# Patient Record
Sex: MALE | Race: WHITE | Employment: FULL TIME | URBAN - METROPOLITAN AREA
[De-identification: names, ages, dates, MRNs, and addresses within clinical notes are randomized per-mention and may not be internally consistent; named-entity substitution may affect disease eponyms.]

---

## 2017-08-22 PROBLEM — E05.90 HYPERTHYROIDISM: Status: ACTIVE | Noted: 2017-08-22

## 2017-08-22 PROBLEM — I48.91 ATRIAL FIBRILLATION WITH RVR (HCC): Status: ACTIVE | Noted: 2017-08-22

## 2017-08-23 PROBLEM — I10 ESSENTIAL HYPERTENSION: Chronic | Status: ACTIVE | Noted: 2017-08-23

## 2017-09-05 ENCOUNTER — OFFICE VISIT (OUTPATIENT)
Dept: CARDIOLOGY CLINIC | Age: 52
End: 2017-09-05

## 2017-09-05 VITALS
WEIGHT: 199.6 LBS | SYSTOLIC BLOOD PRESSURE: 124 MMHG | DIASTOLIC BLOOD PRESSURE: 82 MMHG | HEIGHT: 74 IN | BODY MASS INDEX: 25.62 KG/M2 | HEART RATE: 80 BPM

## 2017-09-05 DIAGNOSIS — I10 ESSENTIAL HYPERTENSION: Chronic | ICD-10-CM

## 2017-09-05 DIAGNOSIS — I48.91 ATRIAL FIBRILLATION WITH RVR (HCC): Primary | ICD-10-CM

## 2017-09-05 DIAGNOSIS — E05.90 HYPERTHYROIDISM: ICD-10-CM

## 2017-09-05 PROCEDURE — 99214 OFFICE O/P EST MOD 30 MIN: CPT | Performed by: INTERNAL MEDICINE

## 2017-09-05 RX ORDER — DILTIAZEM HYDROCHLORIDE 120 MG/1
120 CAPSULE, COATED, EXTENDED RELEASE ORAL DAILY
Qty: 30 CAPSULE | Refills: 3 | Status: SHIPPED | OUTPATIENT
Start: 2017-09-05 | End: 2018-05-10 | Stop reason: ALTCHOICE

## 2017-09-05 RX ORDER — PROPRANOLOL HYDROCHLORIDE 40 MG/1
40 TABLET ORAL 3 TIMES DAILY
Qty: 60 TABLET | Refills: 3 | Status: SHIPPED | OUTPATIENT
Start: 2017-09-05 | End: 2017-10-12 | Stop reason: ALTCHOICE

## 2017-10-12 ENCOUNTER — OFFICE VISIT (OUTPATIENT)
Dept: CARDIOLOGY CLINIC | Age: 52
End: 2017-10-12

## 2017-10-12 VITALS
HEART RATE: 60 BPM | DIASTOLIC BLOOD PRESSURE: 74 MMHG | BODY MASS INDEX: 26.44 KG/M2 | WEIGHT: 206 LBS | RESPIRATION RATE: 16 BRPM | SYSTOLIC BLOOD PRESSURE: 116 MMHG | HEIGHT: 74 IN

## 2017-10-12 DIAGNOSIS — E05.90 HYPERTHYROIDISM: ICD-10-CM

## 2017-10-12 DIAGNOSIS — I10 ESSENTIAL HYPERTENSION: Chronic | ICD-10-CM

## 2017-10-12 DIAGNOSIS — I48.91 ATRIAL FIBRILLATION WITH RVR (HCC): Primary | ICD-10-CM

## 2017-10-12 PROCEDURE — 99214 OFFICE O/P EST MOD 30 MIN: CPT | Performed by: INTERNAL MEDICINE

## 2017-10-12 RX ORDER — PROPRANOLOL HYDROCHLORIDE 120 MG/1
120 CAPSULE, EXTENDED RELEASE ORAL DAILY
Qty: 90 CAPSULE | Refills: 3 | Status: SHIPPED | OUTPATIENT
Start: 2017-10-12 | End: 2017-12-07 | Stop reason: SDUPTHER

## 2017-10-12 RX ORDER — PROPRANOLOL HYDROCHLORIDE 120 MG/1
120 CAPSULE, EXTENDED RELEASE ORAL DAILY
Qty: 30 CAPSULE | Refills: 3 | Status: SHIPPED | OUTPATIENT
Start: 2017-10-12 | End: 2017-10-12 | Stop reason: SDUPTHER

## 2017-10-12 RX ORDER — METHIMAZOLE 10 MG/1
10 TABLET ORAL 3 TIMES DAILY
Status: ON HOLD | COMMUNITY
Start: 2017-09-30 | End: 2017-12-19 | Stop reason: HOSPADM

## 2017-10-12 NOTE — PATIENT INSTRUCTIONS
[id number] Score for Atrial Fibrillation Stroke Risk   Risk   Factors  Component Value   C CHF No 0   H HTN Yes 1   A2 Age >= 76 No,  (50 y.o.) 0   D DM No 0   S2 Prior Stroke/TIA No 0   V Vascular Disease No 0   A Age 74-69 No,  (56 y.o.) 0   Sc Sex male 0    [id number]  Score  1   Score last updated 43/27/47 3:35 AM    Click here for a link to the UpToDate guideline \"Atrial Fibrillation: Anticoagulation therapy to prevent embolization    Disclaimer: Risk Score calculation is dependent on accuracy of patient problem list and past encounter diagnosis.

## 2017-10-12 NOTE — PROGRESS NOTES
twice a week        Review of Systems:   · Constitutional: No Fever or Weight Loss   · Eyes: No Decreased Vision  · ENT: No Headaches, Hearing Loss or Vertigo  · Cardiovascular: as per note above   · Respiratory: No cough or wheezing and as per note above. · Gastrointestinal: No abdominal pain, appetite loss, blood in stools, constipation, diarrhea or heartburn  · Genitourinary: No dysuria, trouble voiding, or hematuria  · Musculoskeletal:  None  · Integumentary: No rash or pruritis  · Neurological: No TIA or stroke symptoms  · Psychiatric: No anxiety or depression  · Endocrine: No malaise, fatigue or temperature intolerance  · Hematologic/Lymphatic: No bleeding problems, blood clots or swollen lymph nodes  · Allergic/Immunologic: No nasal congestion or hives    Objective:      Physical Exam:  /74 (Site: Left Arm, Position: Sitting, Cuff Size: Medium Adult)   Pulse 60   Resp 16   Ht 6' 2\" (1.88 m)   Wt 206 lb (93.4 kg)   BMI 26.45 kg/m²   Wt Readings from Last 3 Encounters:   10/12/17 206 lb (93.4 kg)   09/05/17 199 lb 9.6 oz (90.5 kg)   08/26/17 199 lb (90.3 kg)     Body mass index is 26.45 kg/m². Vitals:    10/12/17 0935   BP: 116/74   Pulse: 60   Resp: 16        General Appearance:  No distress, conversant  Constitutional:  Well developed, Well nourished, No acute distress, Non-toxic appearance. HENT:  Normocephalic, Atraumatic, Bilateral external ears normal, Oropharynx moist, No oral exudates, Nose normal. Neck- Normal range of motion, No tenderness, Supple, No stridor,no apical-carotid delay  Eyes:  PERRL, EOMI, Conjunctiva normal, No discharge. Respiratory:  Normal breath sounds, No respiratory distress, No wheezing, No chest tenderness. ,no use of accessory muscles,   Cardiovascular: (PMI) apex non displaced,no lifts no thrills,irregular, tachy, S1 and S2 audible, No added heart sounds, No signs of ankle edema, or volume overload, No evidence of JVD  GI:  Bowel sounds normal, Soft, No tenderness, No masses, No gross visceromegaly   :  No costovertebral angle tenderness   Musculoskeletal:  No edema, no tenderness, no deformities. Back- no tenderness  Integument:  Well hydrated, no rash   Lymphatic:  No lymphadenopathy noted   Neurologic:  Alert & oriented x 3, CN 2-12 normal, normal motor function, normal sensory function, no focal deficits noted   Psychiatric:  Speech and behavior appropriate       Medical decision making and Data review:  DATA:  Lab Results   Component Value Date    TROPONINT <0.010 08/22/2017     BNP:    Lab Results   Component Value Date    PROBNP <5.00 08/24/2017     PT/INR:  No results found for: PTINR  No results found for: LABA1C  No results found for: CHOL, TRIG, HDL, LDLCALC, LDLDIRECT  Lab Results   Component Value Date    ALT 32 08/22/2017    AST 25 08/22/2017     Echo: 8/23/17  Summary   Normal left ventricle size and systolic function. EF is 60 %   Mild to moderate bilateral atrial dilatation.   Right ventricular systolic pressure of 43 mm Hg consistent with moderate   pulmonary hypertension.   Moderate mitral, tricuspid and trace pulmonic regurgitation is present.   Mild aortic regurgitation is noted.   No evidence of pericardial effusion.       All labs, medications and tests reviewed by myself including data and history from outside source , patient and available family . Assessment & Plan:      1. Atrial fibrillation with RVR ([address]    2. Hyperthyroidism    3. Essential hypertension         Atrial fibrillation with RVR ([facility])  Continue rate control plan on cardizem 120 mg daily. Continue eliquis 5mg bid. Switch to inderal 120 mg daily , we will attempt cardioversion in 2 month once thyroid issues are resolved   He does have biatrial enlargement but I think he deserves a cardioversion attempt. If heDoes not cardiovert on his on own  We will attempt cardioversion only when his t3 and t4 are within normal range .      Essential hypertension  Blood pressure well controlled except for few numbers in 140's     Hyperthyroidism  On methamazole, seeing Dr Abelardo Todd next week. Called and d/w dr Abelardo Todd     Dyslipidemia :  Esteban Keenan had lab work recently,  Lipid profile was reviewed with patient. He had an FMLA paperwork and disability paperwork which was filled out    Counseled extensively and medication compliance urged. Various goals were discussed and questions answered. Continue current medications. Appropriate prescriptions are addressed and refills ordered. Questions answered and patient verbalizes understanding. Call for any problems, questions, or concerns. Continue all other medications of all above medical condition listed as is. Return in about 2 months (around 12/12/2017).

## 2017-10-13 ENCOUNTER — TELEPHONE (OUTPATIENT)
Dept: CARDIOLOGY CLINIC | Age: 52
End: 2017-10-13

## 2017-10-13 NOTE — TELEPHONE ENCOUNTER
Received fax from [address] re:  Prescription sent to [facility] yesterday for Propranolol 120 mg ER 1 daily may be duplicate therapy with beta blockers patient is already taking. Called [facility] ([phone]) and spoke w/pharmacist (Partha)---advised pharmacist that Dr. Ruby Roman saw patient for OV yesterday and recommended patient to switch from short acting propranolol 40 mg TID to propranolol  mg qd. Pharmacist verbalized understanding and states she will get patient's new prescription filled and sent out.

## 2017-11-02 LAB
CHOLESTEROL, TOTAL: 217 MG/DL
CHOLESTEROL/HDL RATIO: NORMAL
HDLC SERPL-MCNC: 70 MG/DL (ref 35–70)
LDL CHOLESTEROL CALCULATED: 135 MG/DL (ref 0–160)
T3 FREE: NORMAL
T4 FREE: 0.3
TRIGL SERPL-MCNC: 58 MG/DL
TSH SERPL DL<=0.05 MIU/L-ACNC: 0 UIU/ML
VLDLC SERPL CALC-MCNC: NORMAL MG/DL

## 2017-11-03 LAB
CHOLESTEROL, TOTAL: 217 MG/DL
CHOLESTEROL/HDL RATIO: NORMAL
HDLC SERPL-MCNC: 70 MG/DL (ref 35–70)
LDL CHOLESTEROL CALCULATED: 135 MG/DL (ref 0–160)
T4 TOTAL: 0.3
TRIGL SERPL-MCNC: 58 MG/DL
TSH SERPL DL<=0.05 MIU/L-ACNC: 0 UIU/ML
VLDLC SERPL CALC-MCNC: NORMAL MG/DL

## 2017-11-21 ENCOUNTER — HOSPITAL ENCOUNTER (OUTPATIENT)
Dept: NUCLEAR MEDICINE | Age: 52
Discharge: HOME | End: 2017-11-21
Attending: INTERNAL MEDICINE | Admitting: INTERNAL MEDICINE

## 2017-11-21 DIAGNOSIS — E05.90 THYROTOXICOSIS WITHOUT THYROID STORM: ICD-10-CM

## 2017-11-22 ENCOUNTER — HOSPITAL ENCOUNTER (OUTPATIENT)
Dept: NUCLEAR MEDICINE | Age: 52
Discharge: HOME | End: 2017-11-20
Attending: INTERNAL MEDICINE | Admitting: INTERNAL MEDICINE

## 2017-11-22 DIAGNOSIS — E05.90 THYROTOXICOSIS WITHOUT THYROID STORM: ICD-10-CM

## 2017-11-22 DIAGNOSIS — E05.90 T3 THYROTOXICOSIS: ICD-10-CM

## 2017-12-07 ENCOUNTER — OFFICE VISIT (OUTPATIENT)
Dept: CARDIOLOGY CLINIC | Age: 52
End: 2017-12-07

## 2017-12-07 VITALS
DIASTOLIC BLOOD PRESSURE: 76 MMHG | WEIGHT: 214 LBS | HEIGHT: 74 IN | BODY MASS INDEX: 27.46 KG/M2 | HEART RATE: 97 BPM | SYSTOLIC BLOOD PRESSURE: 120 MMHG

## 2017-12-07 DIAGNOSIS — I10 ESSENTIAL HYPERTENSION: Chronic | ICD-10-CM

## 2017-12-07 DIAGNOSIS — I48.91 ATRIAL FIBRILLATION WITH RVR (HCC): Primary | ICD-10-CM

## 2017-12-07 DIAGNOSIS — E05.90 HYPERTHYROIDISM: ICD-10-CM

## 2017-12-07 PROCEDURE — 93000 ELECTROCARDIOGRAM COMPLETE: CPT | Performed by: INTERNAL MEDICINE

## 2017-12-07 PROCEDURE — 99214 OFFICE O/P EST MOD 30 MIN: CPT | Performed by: INTERNAL MEDICINE

## 2017-12-07 RX ORDER — PROPRANOLOL HYDROCHLORIDE 160 MG/1
160 CAPSULE, EXTENDED RELEASE ORAL DAILY
Qty: 30 CAPSULE | Refills: 5 | Status: SHIPPED | OUTPATIENT
Start: 2017-12-07 | End: 2017-12-11 | Stop reason: SDUPTHER

## 2017-12-07 RX ORDER — FUROSEMIDE 20 MG/1
20 TABLET ORAL 2 TIMES DAILY
Qty: 60 TABLET | Refills: 3 | Status: SHIPPED | OUTPATIENT
Start: 2017-12-07 | End: 2017-12-08 | Stop reason: SDUPTHER

## 2017-12-07 RX ORDER — FUROSEMIDE 20 MG/1
20 TABLET ORAL DAILY
Qty: 60 TABLET | Refills: 3 | Status: ON HOLD | OUTPATIENT
Start: 2017-12-07 | End: 2017-12-19 | Stop reason: HOSPADM

## 2017-12-07 NOTE — PROGRESS NOTES
Mother     Heart Attack Father     Heart Disease Father     High Cholesterol Father      Social History   Substance Use Topics    Smoking status: Never Smoker    Smokeless tobacco: Current User     Types: Snuff    Alcohol use 1.2 oz/week     2 Cans of beer per week      Comment: once or twice a week        Review of Systems:   · Constitutional: No Fever or Weight Loss   · Eyes: No Decreased Vision  · ENT: No Headaches, Hearing Loss or Vertigo  · Cardiovascular: as per note above   · Respiratory: No cough or wheezing and as per note above. · Gastrointestinal: No abdominal pain, appetite loss, blood in stools, constipation, diarrhea or heartburn  · Genitourinary: No dysuria, trouble voiding, or hematuria  · Musculoskeletal:  None  · Integumentary: No rash or pruritis  · Neurological: No TIA or stroke symptoms  · Psychiatric: No anxiety or depression  · Endocrine: No malaise, fatigue or temperature intolerance  · Hematologic/Lymphatic: No bleeding problems, blood clots or swollen lymph nodes  · Allergic/Immunologic: No nasal congestion or hives    Objective:      Physical Exam:  /76   Pulse 97   Ht 6' 2\" (1.88 m)   Wt 214 lb (97.1 kg)   BMI 27.48 kg/m²   Wt Readings from Last 3 Encounters:   12/07/17 214 lb (97.1 kg)   10/12/17 206 lb (93.4 kg)   09/05/17 199 lb 9.6 oz (90.5 kg)     Body mass index is 27.48 kg/m². Vitals:    12/07/17 1631   BP: 120/76   Pulse: 97        General Appearance:  No distress, conversant  Constitutional:  Well developed, Well nourished, No acute distress, Non-toxic appearance. HENT:  Normocephalic, Atraumatic, Bilateral external ears normal, Oropharynx moist, No oral exudates, Nose normal. Neck- Normal range of motion, No tenderness, Supple, No stridor,no apical-carotid delay  Eyes:  PERRL, EOMI, Conjunctiva normal, No discharge. Respiratory:  Normal breath sounds, No respiratory distress, No wheezing, No chest tenderness. ,no use of accessory muscles,   Cardiovascular: are resolved . He does have biatrial enlargement but I think he deserves a cardioversion attempt. If he Does not convert on his on own, try small dose of lasix for SOB and leg swelling  We will attempt cardioversion only when his t3 and t4 are within normal range . Essential hypertension  Blood pressure well controlled except for few numbers in 140's     Hyperthyroidism  On methamazole, seeing Dr Adam Hutson next week. Called and d/w dr Adam Hutson     Dyslipidemia :  Henry Arrieta had lab work recently,  Lipid profile was reviewed with patient. He had an FMLA paperwork and disability paperwork which was filled out    Counseled extensively and medication compliance urged. Various goals were discussed and questions answered. Continue current medications. Appropriate prescriptions are addressed and refills ordered. Questions answered and patient verbalizes understanding. Call for any problems, questions, or concerns. Continue all other medications of all above medical condition listed as is. Return in about 5 weeks (around 1/11/2018).

## 2017-12-08 RX ORDER — FUROSEMIDE 20 MG/1
20 TABLET ORAL 2 TIMES DAILY
Qty: 180 TABLET | Refills: 1 | Status: SHIPPED | OUTPATIENT
Start: 2017-12-08 | End: 2018-01-18 | Stop reason: ALTCHOICE

## 2017-12-08 NOTE — TELEPHONE ENCOUNTER
Received fax from [address] [place] [address] requesting patient be allowed to receive 90-day supply on his Furosemide prescription for cost savings (eRx sent to [facility] yesterday was for 30-day supply). Chart review shows 2 Rxs sent yesterday for Furosemide---one Rx for 20 mg BID and another for 20 mg 3 times a week which may be titrated up as needed for swelling. Will confirm w/Dr. Ruby Roman if patient is supposed to use both Rxs?         If so, the PRN Rx is already enough for 90-day supply; eRx for Furosemide 20 mg BID #180 Rx1 pended and routed to Dr. Ruby Roman for approval.

## 2017-12-11 NOTE — TELEPHONE ENCOUNTER
Received request from [employer] for patient to be allowed to receive 90-day supply on his Propranolol prescription (Rx sent recently was for 30-day supply).       eRx to [facility]:  Propranolol  mg qd #90 Rx1 pended and routed to Dr. Thompson Weiss for approval.

## 2017-12-12 RX ORDER — PROPRANOLOL HYDROCHLORIDE 160 MG/1
160 CAPSULE, EXTENDED RELEASE ORAL DAILY
Qty: 90 CAPSULE | Refills: 1 | Status: SHIPPED | OUTPATIENT
Start: 2017-12-12 | End: 2017-12-15

## 2017-12-13 ENCOUNTER — OFFICE VISIT (OUTPATIENT)
Dept: SURGERY | Age: 52
End: 2017-12-13

## 2017-12-13 ENCOUNTER — TELEPHONE (OUTPATIENT)
Dept: SURGERY | Age: 52
End: 2017-12-13

## 2017-12-13 VITALS
DIASTOLIC BLOOD PRESSURE: 72 MMHG | BODY MASS INDEX: 23.1 KG/M2 | HEIGHT: 74 IN | SYSTOLIC BLOOD PRESSURE: 109 MMHG | WEIGHT: 180 LBS | HEART RATE: 68 BPM

## 2017-12-13 DIAGNOSIS — E05.80 THYROID TOXICITY, EXOGENOUS: Primary | ICD-10-CM

## 2017-12-13 PROCEDURE — 99204 OFFICE O/P NEW MOD 45 MIN: CPT | Performed by: SURGERY

## 2017-12-13 ASSESSMENT — ENCOUNTER SYMPTOMS
ANAL BLEEDING: 0
SORE THROAT: 0
EYE REDNESS: 0
COLOR CHANGE: 0
RECTAL PAIN: 0
STRIDOR: 0
EYE ITCHING: 0
APNEA: 0
PHOTOPHOBIA: 0
CHOKING: 0
CONSTIPATION: 0
BACK PAIN: 0

## 2017-12-13 NOTE — LETTER
Procedure Scheduling Request   [phone] Fax [phone]    Location: [address] [place] [address]     Patient Name:  Frankey Raker   Birthday:   1965    Social Security:     Gender:  male   Home Phone:  [phone]  Cell Phone: [phone] (home)      Address:  [address]      Primary Insurance:  [employer]   ID#:   [id number]  Secondary Ins:                     ID#:       Patient Type:    23HR Observe    Practitioner: Dr. Myah Sandhu      Procedure: Thyroidectomy    Anesthesia:  General    Diagnosis:   Thyrotoxicosis  Procedure Date:  12/18/2017  Time:9:30AM  Length of procedure: 1 Hour  Testing:  Phone Assessment        Pre-Admission testing   Date/Time:      Special needs for Procedure:   None    Weight: Weight: 180 lb (81.6 kg)    Is the Patient on Blood Thinner? Yes      No   If YES told to stop on: Allergy:  LATEX    Iodine    DYE  Other Allergies: Will the patient have a  the day of procedure? Yes    No  H&P:                                                                 Consent:       Will be completed day of Procedure                  Completed consent faxed   Surgeon will bring                                              Will be completed day of procedure   Will be completed by Dr. Isabel Carlton in [facility]    In EPIC/Chart     **For IR procedures please fax all orders, H&P, Medication lists and prior reports to [phone]

## 2017-12-13 NOTE — PROGRESS NOTES
HENT: Negative for ear pain, mouth sores, sore throat and tinnitus. Eyes: Negative for photophobia, redness and itching. Respiratory: Negative for apnea, choking and stridor. Cardiovascular: Negative for chest pain and palpitations. Gastrointestinal: Negative for anal bleeding, constipation and rectal pain. Endocrine: Negative for polydipsia. Genitourinary: Negative for enuresis, flank pain and hematuria. Musculoskeletal: Negative for back pain, joint swelling and myalgias. Skin: Negative for color change and pallor. Allergic/Immunologic: Negative for environmental allergies. Neurological: Negative for syncope and speech difficulty. Psychiatric/Behavioral: Negative for confusion and hallucinations. OBJECTIVE:  Physical Exam:    /72   Pulse 68   Ht 6' 2\" (1.88 m)   Wt 180 lb (81.6 kg)   BMI 23.11 kg/m²      Physical Exam   Constitutional: He is oriented to person, place, and time. He appears well-developed and well-nourished. HENT:   Head: Normocephalic. Eyes: Pupils are equal, round, and reactive to light. Neck: Normal range of motion. Neck supple. Cardiovascular: Normal rate. Pulmonary/Chest: Effort normal.   Abdominal: Soft. He exhibits no distension and no mass. There is no tenderness. There is no rebound and no guarding. Musculoskeletal: Normal range of motion. Neurological: He is alert and oriented to person, place, and time. Skin: Skin is warm. Psychiatric: He has a normal mood and affect. ASSESSMENT:  1. Thyroid toxicity, exogenous          PLAN:  Treatment:  Will procced [facility] total thyroidectomy. Patient counseled on risks, benefits, and alternatives of treatment plan at length today. Patient states an understanding and willingness to proceed with plan. No orders of the defined types were placed in this encounter. No orders of the defined types were placed in this encounter.        Follow Up:  Return in about 2 weeks (around 12/27/2017).     Anthony Ward MD

## 2017-12-13 NOTE — TELEPHONE ENCOUNTER
Patient is scheduled for Thyroidectomy @ [facility] 12/18/17 @ 9:30. PAT 12/15/17 @ 10:00. Patient instructed to hold Blood Thinner beginning 12/13/17 and hold Lasix the day of surgery. NPO after midnight    P/O appointment 1/3/18 @ 1:00 in [facility].

## 2017-12-14 ENCOUNTER — TELEPHONE (OUTPATIENT)
Dept: CARDIOLOGY CLINIC | Age: 52
End: 2017-12-14

## 2017-12-14 NOTE — TELEPHONE ENCOUNTER
HE is intermediate risk , HE can hold eliquis for 3 days before surgery and restart after surgery when safe

## 2017-12-14 NOTE — TELEPHONE ENCOUNTER
Dr Riya Galicia sent a request for cardiac clearance set to have Thyroidectomy 12/18 Dr Irineo Patino is requesting clearance request scanned into reg

## 2017-12-15 ENCOUNTER — TELEPHONE (OUTPATIENT)
Dept: CARDIOLOGY CLINIC | Age: 52
End: 2017-12-15

## 2017-12-15 ENCOUNTER — HOSPITAL ENCOUNTER (OUTPATIENT)
Dept: PREADMISSION TESTING | Age: 52
Discharge: HOME | End: 2017-12-15
Attending: SURGERY | Admitting: SURGERY

## 2017-12-15 VITALS
SYSTOLIC BLOOD PRESSURE: 116 MMHG | OXYGEN SATURATION: 99 % | TEMPERATURE: 98.2 F | RESPIRATION RATE: 16 BRPM | HEIGHT: 73 IN | HEART RATE: 68 BPM | WEIGHT: 204.38 LBS | DIASTOLIC BLOOD PRESSURE: 76 MMHG | BODY MASS INDEX: 27.09 KG/M2

## 2017-12-15 LAB
ANION GAP SERPL CALCULATED.3IONS-SCNC: 9 MMOL/L (ref 4–16)
BUN BLDV-MCNC: 10 MG/DL (ref 6–23)
CALCIUM SERPL-MCNC: 9.1 MG/DL (ref 8.3–10.6)
CHLORIDE BLD-SCNC: 104 MMOL/L (ref 99–110)
CO2: 28 MMOL/L (ref 21–32)
CREAT SERPL-MCNC: 0.8 MG/DL (ref 0.9–1.3)
EKG ATRIAL RATE: 272 BPM
EKG DIAGNOSIS: NORMAL
EKG P AXIS: 71 DEGREES
EKG Q-T INTERVAL: 398 MS
EKG QRS DURATION: 88 MS
EKG QTC CALCULATION (BAZETT): 423 MS
EKG R AXIS: 1 DEGREES
EKG T AXIS: 60 DEGREES
EKG VENTRICULAR RATE: 68 BPM
GFR AFRICAN AMERICAN: >60 ML/MIN/1.73M2
GFR NON-AFRICAN AMERICAN: >60 ML/MIN/1.73M2
GLUCOSE BLD-MCNC: 103 MG/DL (ref 70–99)
HCT VFR BLD CALC: 46.9 % (ref 42–52)
HEMOGLOBIN: 15.6 GM/DL (ref 13.5–18)
MCH RBC QN AUTO: 27.5 PG (ref 27–31)
MCHC RBC AUTO-ENTMCNC: 33.3 % (ref 32–36)
MCV RBC AUTO: 82.7 FL (ref 78–100)
PDW BLD-RTO: 14.6 % (ref 11.7–14.9)
PLATELET # BLD: 249 K/CU MM (ref 140–440)
PMV BLD AUTO: 9.1 FL (ref 7.5–11.1)
POTASSIUM SERPL-SCNC: 5.7 MMOL/L (ref 3.5–5.1)
RBC # BLD: 5.67 M/CU MM (ref 4.6–6.2)
SODIUM BLD-SCNC: 141 MMOL/L (ref 135–145)
WBC # BLD: 6.7 K/CU MM (ref 4–10.5)

## 2017-12-15 RX ORDER — PROPRANOLOL HYDROCHLORIDE 20 MG/1
20 TABLET ORAL 3 TIMES DAILY
COMMUNITY
End: 2018-05-10 | Stop reason: ALTCHOICE

## 2017-12-15 RX ORDER — POTASSIUM IODIDE 1 G/ML
100 SOLUTION ORAL 3 TIMES DAILY
Status: ON HOLD | COMMUNITY
End: 2017-12-19 | Stop reason: HOSPADM

## 2017-12-15 NOTE — TELEPHONE ENCOUNTER
Cardiac clearance for Thyroidectomy on 12/18/17. Faxed the clearance to Dr. Maggy Albert and Dr. Radha Mayer.

## 2017-12-18 PROBLEM — E05.80: Status: ACTIVE | Noted: 2017-12-18

## 2018-01-03 ENCOUNTER — OFFICE VISIT (OUTPATIENT)
Dept: SURGERY | Age: 53
End: 2018-01-03

## 2018-01-03 VITALS
DIASTOLIC BLOOD PRESSURE: 64 MMHG | HEART RATE: 72 BPM | SYSTOLIC BLOOD PRESSURE: 131 MMHG | BODY MASS INDEX: 27.38 KG/M2 | HEIGHT: 73 IN | WEIGHT: 206.57 LBS

## 2018-01-03 DIAGNOSIS — E05.80 THYROID TOXICITY, EXOGENOUS: Primary | ICD-10-CM

## 2018-01-03 PROCEDURE — 99024 POSTOP FOLLOW-UP VISIT: CPT | Performed by: SURGERY

## 2018-01-17 ENCOUNTER — OFFICE VISIT (OUTPATIENT)
Dept: SURGERY | Age: 53
End: 2018-01-17

## 2018-01-17 VITALS
SYSTOLIC BLOOD PRESSURE: 116 MMHG | BODY MASS INDEX: 26.44 KG/M2 | DIASTOLIC BLOOD PRESSURE: 72 MMHG | HEART RATE: 85 BPM | HEIGHT: 74 IN | WEIGHT: 206 LBS | OXYGEN SATURATION: 97 %

## 2018-01-17 DIAGNOSIS — Z98.890 POST-OPERATIVE STATE: Primary | ICD-10-CM

## 2018-01-17 PROCEDURE — 99024 POSTOP FOLLOW-UP VISIT: CPT | Performed by: PHYSICIAN ASSISTANT

## 2018-01-17 NOTE — PROGRESS NOTES
Chief Complaint   Patient presents with    Post-Op Check     2nd p/o thyroidectomy 12/18/18         SUBJECTIVE:  Patient here for post op visit s/p thyroidectomy. This is 2nd visit. Pain is minimal.  Wounds: min bruising and no discharge. Some fatigue    OBJECTIVE:  Physical Exam   Constitutional: He is oriented to person, place, and time. He appears well-developed and well-nourished. No distress. Neck:       Incision line healing well. No signs of infection or drainage. No problems swallowing. No lymphadenopathy. Abdominal: Soft. Bowel sounds are normal. He exhibits no distension and no mass. There is no tenderness. There is no rebound and no guarding. Neurological: He is alert and oriented to person, place, and time. Skin: Skin is warm and dry. No rash noted. He is not diaphoretic. No erythema. No pallor. Psychiatric: He has a normal mood and affect. Wound well healed without signs of active infection. Suture line intact. Path reveals:   Final Pathologic Diagnosis:  Thyroid; total thyroidectomy:  -     Thyroid showing diffuse hyperplastic changes,  clinically thyrotoxicosis. -     Left parathyroid tissue. ASSESSMENT:  Patient doing well on this post operative check. Wounds well healed. 1. Post-operative state        PLAN:  Continue same  Increase activity as tolerated  Can go back to work without restrictions  F/U PRN  Will be seeing Brian Lereagan in Feb. Pt will call and get labs drawn earlier. No orders of the defined types were placed in this encounter. No orders of the defined types were placed in this encounter. Follow Up: Return if symptoms worsen or fail to improve.   Angel Morel PA-C

## 2018-01-18 ENCOUNTER — OFFICE VISIT (OUTPATIENT)
Dept: CARDIOLOGY CLINIC | Age: 53
End: 2018-01-18

## 2018-01-18 VITALS
RESPIRATION RATE: 14 BRPM | WEIGHT: 213 LBS | BODY MASS INDEX: 27.34 KG/M2 | DIASTOLIC BLOOD PRESSURE: 80 MMHG | HEART RATE: 84 BPM | SYSTOLIC BLOOD PRESSURE: 124 MMHG | HEIGHT: 74 IN

## 2018-01-18 DIAGNOSIS — E05.90 HYPERTHYROIDISM: ICD-10-CM

## 2018-01-18 DIAGNOSIS — I10 ESSENTIAL HYPERTENSION: Chronic | ICD-10-CM

## 2018-01-18 DIAGNOSIS — E05.80: ICD-10-CM

## 2018-01-18 DIAGNOSIS — I48.3 TYPICAL ATRIAL FLUTTER (HCC): ICD-10-CM

## 2018-01-18 DIAGNOSIS — I48.91 ATRIAL FIBRILLATION WITH RVR (HCC): Primary | ICD-10-CM

## 2018-01-18 PROCEDURE — 93000 ELECTROCARDIOGRAM COMPLETE: CPT | Performed by: INTERNAL MEDICINE

## 2018-01-18 PROCEDURE — 99214 OFFICE O/P EST MOD 30 MIN: CPT | Performed by: INTERNAL MEDICINE

## 2018-01-18 NOTE — PROGRESS NOTES
Typical atrial flutter ([facility] [address]    4. Hyperthyroidism    5. Essential hypertension         Atrial fibrillation with RVR ([facility])  He is in sinus rhythm now. I would continue Eliquis until we're sure that he is not having paroxysmal A. Fib. Place a 30 day event monitor. If the event monitor shows no A. fib. He can stop Eliquis. We'll try to wean off Cardizem    Essential hypertension  Blood pressure well controlled except for few numbers in 140's     Hyperthyroidism  Status thyrodectomy , on levothyroxine now,. Sees Dr Andres Mccloud . We'll try to wean off propranolol     Dyslipidemia :  Frank De La Torre had lab work recently,  Lipid profile was reviewed with patient. He had an FMLA paperwork and disability paperwork which was filled out. He can return to work as soon as possible    Counseled extensively and medication compliance urged. Various goals were discussed and questions answered. Continue current medications. Appropriate prescriptions are addressed and refills ordered. Questions answered and patient verbalizes understanding. Call for any problems, questions, or concerns. Continue all other medications of all above medical condition listed as is. Return in about 3 months (around 4/18/2018).

## 2018-01-23 ENCOUNTER — NURSE ONLY (OUTPATIENT)
Dept: CARDIOLOGY CLINIC | Age: 53
End: 2018-01-23

## 2018-01-23 DIAGNOSIS — I48.91 ATRIAL FIBRILLATION WITH RVR (HCC): Primary | ICD-10-CM

## 2018-01-23 PROCEDURE — 93270 REMOTE 30 DAY ECG REV/REPORT: CPT | Performed by: INTERNAL MEDICINE

## 2018-01-31 ENCOUNTER — TELEPHONE (OUTPATIENT)
Dept: CARDIOLOGY CLINIC | Age: 53
End: 2018-01-31

## 2018-02-05 ENCOUNTER — TELEPHONE (OUTPATIENT)
Dept: CARDIOLOGY CLINIC | Age: 53
End: 2018-02-05

## 2018-02-26 ENCOUNTER — TELEPHONE (OUTPATIENT)
Dept: CARDIOLOGY CLINIC | Age: 53
End: 2018-02-26

## 2018-02-26 PROCEDURE — 93272 ECG/REVIEW INTERPRET ONLY: CPT | Performed by: INTERNAL MEDICINE

## 2018-02-27 ENCOUNTER — TELEPHONE (OUTPATIENT)
Dept: CARDIOLOGY CLINIC | Age: 53
End: 2018-02-27

## 2018-05-10 ENCOUNTER — OFFICE VISIT (OUTPATIENT)
Dept: CARDIOLOGY CLINIC | Age: 53
End: 2018-05-10

## 2018-05-10 VITALS
HEART RATE: 76 BPM | DIASTOLIC BLOOD PRESSURE: 80 MMHG | RESPIRATION RATE: 16 BRPM | WEIGHT: 216 LBS | HEIGHT: 74 IN | BODY MASS INDEX: 27.72 KG/M2 | SYSTOLIC BLOOD PRESSURE: 122 MMHG

## 2018-05-10 DIAGNOSIS — E05.90 HYPERTHYROIDISM: ICD-10-CM

## 2018-05-10 DIAGNOSIS — I10 ESSENTIAL HYPERTENSION: Chronic | ICD-10-CM

## 2018-05-10 DIAGNOSIS — I48.91 ATRIAL FIBRILLATION WITH RVR (HCC): Primary | ICD-10-CM

## 2018-05-10 DIAGNOSIS — I48.3 TYPICAL ATRIAL FLUTTER (HCC): ICD-10-CM

## 2018-05-10 PROCEDURE — 99213 OFFICE O/P EST LOW 20 MIN: CPT | Performed by: INTERNAL MEDICINE

## 2018-05-10 RX ORDER — LEVOTHYROXINE SODIUM 0.12 MG/1
135 TABLET ORAL DAILY
COMMUNITY
Start: 2018-05-03 | End: 2021-08-27

## 2018-05-10 RX ORDER — ASPIRIN 325 MG
325 TABLET, DELAYED RELEASE (ENTERIC COATED) ORAL DAILY
Qty: 30 TABLET | Refills: 3 | Status: SHIPPED | OUTPATIENT
Start: 2018-05-10

## 2018-05-31 NOTE — PROGRESS NOTES
Chief Complaint   Patient presents with    Post-Op Check     1s5t post op thyroidectomy [address] 12/18/17         SUBJECTIVE:  Patient here for post op visit s/p total Thyroidectomy . This is 1st visit. Pain is minimal.  Wounds: min bruising and no discharge. Normal BM    OBJECTIVE:  Physical Exam    Wound well healed without signs of active infection. Suture line intact. Abdomen soft, nontender, nondistended. Path reveals: (NOTE)  Specimen #[id number]  Final Pathologic Diagnosis:  Thyroid; total thyroidectomy:  -     Thyroid showing diffuse hyperplastic changes,  clinically thyrotoxicosis. -     Left parathyroid tissue. Electronically Signed Out By Dereje Gay MD  Specimens Received:  Thyroid: two sutures is right; one suture is left       ASSESSMENT:  Patient doing well on this post operative check. Wounds well healed. 1. Thyroid toxicity, exogenous        PLAN:    Continue same  Increase activity as tolerated        No orders of the defined types were placed in this encounter. No orders of the defined types were placed in this encounter. Follow Up: Return in about 2 weeks (around 1/17/2018).     Lainey Yusuf MD
none

## 2020-08-08 ENCOUNTER — APPOINTMENT (OUTPATIENT)
Dept: GENERAL RADIOLOGY | Age: 55
End: 2020-08-08
Payer: COMMERCIAL

## 2020-08-08 ENCOUNTER — HOSPITAL ENCOUNTER (EMERGENCY)
Age: 55
Discharge: HOME | End: 2020-08-09
Attending: EMERGENCY MEDICINE
Payer: COMMERCIAL

## 2020-08-08 VITALS
BODY MASS INDEX: 28.23 KG/M2 | SYSTOLIC BLOOD PRESSURE: 130 MMHG | RESPIRATION RATE: 16 BRPM | HEIGHT: 74 IN | TEMPERATURE: 98.7 F | WEIGHT: 220 LBS | HEART RATE: 69 BPM | DIASTOLIC BLOOD PRESSURE: 88 MMHG | OXYGEN SATURATION: 97 %

## 2020-08-08 LAB
ALBUMIN SERPL-MCNC: 4.5 GM/DL (ref 3.4–5)
ALP BLD-CCNC: 49 IU/L (ref 40–129)
ALT SERPL-CCNC: 16 U/L (ref 10–40)
ANION GAP SERPL CALCULATED.3IONS-SCNC: 10 MMOL/L (ref 4–16)
AST SERPL-CCNC: 22 IU/L (ref 15–37)
BASE EXCESS MIXED: 4.3 (ref 0–1.2)
BASOPHILS ABSOLUTE: 0 K/CU MM
BASOPHILS RELATIVE PERCENT: 0.5 % (ref 0–1)
BILIRUB SERPL-MCNC: 0.5 MG/DL (ref 0–1)
BUN BLDV-MCNC: 10 MG/DL (ref 6–23)
CALCIUM SERPL-MCNC: 8.1 MG/DL (ref 8.3–10.6)
CHLORIDE BLD-SCNC: 102 MMOL/L (ref 99–110)
CO2: 29 MMOL/L (ref 21–32)
CREAT SERPL-MCNC: 1.2 MG/DL (ref 0.9–1.3)
DIFFERENTIAL TYPE: ABNORMAL
EOSINOPHILS ABSOLUTE: 0.1 K/CU MM
EOSINOPHILS RELATIVE PERCENT: 2 % (ref 0–3)
GFR AFRICAN AMERICAN: >60 ML/MIN/1.73M2
GFR NON-AFRICAN AMERICAN: >60 ML/MIN/1.73M2
GLUCOSE BLD-MCNC: 111 MG/DL (ref 70–99)
HCO3 VENOUS: 31.8 MMOL/L (ref 19–25)
HCT VFR BLD CALC: 45.4 % (ref 42–52)
HEMOGLOBIN: 15.1 GM/DL (ref 13.5–18)
IMMATURE NEUTROPHIL %: 0.3 % (ref 0–0.43)
LYMPHOCYTES ABSOLUTE: 1.6 K/CU MM
LYMPHOCYTES RELATIVE PERCENT: 27.2 % (ref 24–44)
MAGNESIUM: 2.2 MG/DL (ref 1.8–2.4)
MCH RBC QN AUTO: 28.5 PG (ref 27–31)
MCHC RBC AUTO-ENTMCNC: 33.3 % (ref 32–36)
MCV RBC AUTO: 85.7 FL (ref 78–100)
MONOCYTES ABSOLUTE: 0.6 K/CU MM
MONOCYTES RELATIVE PERCENT: 10.1 % (ref 0–4)
NUCLEATED RBC %: 0 %
O2 SAT, VEN: 71.7 % (ref 50–70)
PCO2, VEN: 59 MMHG (ref 38–52)
PDW BLD-RTO: 13.2 % (ref 11.7–14.9)
PH VENOUS: 7.34 (ref 7.32–7.42)
PLATELET # BLD: 181 K/CU MM (ref 140–440)
PMV BLD AUTO: 9.1 FL (ref 7.5–11.1)
PO2, VEN: 39 MMHG (ref 28–48)
POTASSIUM SERPL-SCNC: 3.9 MMOL/L (ref 3.5–5.1)
PRO-BNP: 45.37 PG/ML
RBC # BLD: 5.3 M/CU MM (ref 4.6–6.2)
SEGMENTED NEUTROPHILS ABSOLUTE COUNT: 3.6 K/CU MM
SEGMENTED NEUTROPHILS RELATIVE PERCENT: 59.9 % (ref 36–66)
SODIUM BLD-SCNC: 141 MMOL/L (ref 135–145)
TOTAL IMMATURE NEUTOROPHIL: 0.02 K/CU MM
TOTAL NUCLEATED RBC: 0 K/CU MM
TOTAL PROTEIN: 6.7 GM/DL (ref 6.4–8.2)
TROPONIN T: <0.01 NG/ML
TSH HIGH SENSITIVITY: 8.77 UIU/ML (ref 0.27–4.2)
WBC # BLD: 6 K/CU MM (ref 4–10.5)

## 2020-08-08 PROCEDURE — 99283 EMERGENCY DEPT VISIT LOW MDM: CPT

## 2020-08-08 PROCEDURE — 80053 COMPREHEN METABOLIC PANEL: CPT

## 2020-08-08 PROCEDURE — 96365 THER/PROPH/DIAG IV INF INIT: CPT

## 2020-08-08 PROCEDURE — 84484 ASSAY OF TROPONIN QUANT: CPT

## 2020-08-08 PROCEDURE — 71045 X-RAY EXAM CHEST 1 VIEW: CPT

## 2020-08-08 PROCEDURE — 84443 ASSAY THYROID STIM HORMONE: CPT

## 2020-08-08 PROCEDURE — 2580000003 HC RX 258: Performed by: EMERGENCY MEDICINE

## 2020-08-08 PROCEDURE — 83880 ASSAY OF NATRIURETIC PEPTIDE: CPT

## 2020-08-08 PROCEDURE — 82805 BLOOD GASES W/O2 SATURATION: CPT

## 2020-08-08 PROCEDURE — 83735 ASSAY OF MAGNESIUM: CPT

## 2020-08-08 PROCEDURE — 93005 ELECTROCARDIOGRAM TRACING: CPT | Performed by: EMERGENCY MEDICINE

## 2020-08-08 PROCEDURE — 85025 COMPLETE CBC W/AUTO DIFF WBC: CPT

## 2020-08-08 RX ORDER — SODIUM CHLORIDE, SODIUM LACTATE, POTASSIUM CHLORIDE, CALCIUM CHLORIDE 600; 310; 30; 20 MG/100ML; MG/100ML; MG/100ML; MG/100ML
1000 INJECTION, SOLUTION INTRAVENOUS ONCE
Status: COMPLETED | OUTPATIENT
Start: 2020-08-08 | End: 2020-08-09

## 2020-08-08 RX ADMIN — SODIUM CHLORIDE, POTASSIUM CHLORIDE, SODIUM LACTATE AND CALCIUM CHLORIDE 1000 ML: 600; 310; 30; 20 INJECTION, SOLUTION INTRAVENOUS at 23:42

## 2020-08-09 LAB
AMPHETAMINES: NEGATIVE
BACTERIA: NEGATIVE /HPF
BARBITURATE SCREEN URINE: NEGATIVE
BENZODIAZEPINE SCREEN, URINE: NEGATIVE
BILIRUBIN URINE: NEGATIVE MG/DL
BLOOD, URINE: NEGATIVE
CANNABINOID SCREEN URINE: NEGATIVE
CLARITY: CLEAR
COCAINE METABOLITE: NEGATIVE
COLOR: YELLOW
GLUCOSE, URINE: NEGATIVE MG/DL
KETONES, URINE: NEGATIVE MG/DL
LEUKOCYTE ESTERASE, URINE: NEGATIVE
MUCUS: ABNORMAL HPF
NITRITE URINE, QUANTITATIVE: NEGATIVE
OPIATES, URINE: NEGATIVE
OXYCODONE: NEGATIVE
PH, URINE: 5 (ref 5–8)
PHENCYCLIDINE, URINE: NEGATIVE
PROTEIN UA: NEGATIVE MG/DL
RBC URINE: ABNORMAL /HPF (ref 0–3)
SPECIFIC GRAVITY UA: 1.03 (ref 1–1.03)
SQUAMOUS EPITHELIAL: <1 /HPF
T3 FREE: 2.4 PG/ML (ref 2.3–4.2)
T4 FREE: 1.15 NG/DL (ref 0.9–1.8)
TRANSITIONAL EPITHELIAL: <1 /HPF
TRICHOMONAS: ABNORMAL /HPF
TROPONIN T: <0.01 NG/ML
UROBILINOGEN, URINE: NORMAL MG/DL (ref 0.2–1)
WBC UA: <1 /HPF (ref 0–2)

## 2020-08-09 PROCEDURE — 81001 URINALYSIS AUTO W/SCOPE: CPT

## 2020-08-09 PROCEDURE — 84481 FREE ASSAY (FT-3): CPT

## 2020-08-09 PROCEDURE — 93010 ELECTROCARDIOGRAM REPORT: CPT | Performed by: INTERNAL MEDICINE

## 2020-08-09 PROCEDURE — 80307 DRUG TEST PRSMV CHEM ANLYZR: CPT

## 2020-08-09 PROCEDURE — 84484 ASSAY OF TROPONIN QUANT: CPT

## 2020-08-09 PROCEDURE — 84439 ASSAY OF FREE THYROXINE: CPT
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
## 2020-08-09 NOTE — ED PROVIDER NOTES
[address] ENCOUNTER      Pt Name: Yasmin Dougherty  MRN: [id number]  [place] 1965  Date of evaluation: 8/8/2020  Provider: Maru Almaguer DO    CHIEF COMPLAINT       Chief Complaint   Patient presents with    Hypertension    Chest Pain     \"fluttering\"         HISTORY OF PRESENT ILLNESS      Yasmin Dougherty is a 50 y.o. male who presents to the emergency department  for   Chief Complaint   Patient presents with    Hypertension    Chest Pain     \"fluttering\"       HPI      Nursing Notes, Triage Notes & Vital Signs were reviewed. REVIEW OF SYSTEMS    (2-9 systems for level 4, 10 or more for level 5)     Review of Systems    Except as noted above the remainder of the review of systems was reviewed and negative. PAST MEDICAL HISTORY     Past Medical History:   Diagnosis Date    A-fib [facility])     Sees Dr. Elke Banks H/O echocardiogram 08/23/2017    Normal left ventricle size and systolic function. EF 60% Mild to moderate bilateral atrial dilatation. Moderate MR, TR and mild AR     History of cardiac monitoring 01/23/2018    No afib , normal sinus    Kidney stones Last Time Late 1990's    \"Passed Kidney Stones A Couple Times\"    Narcolepsy     Teeth missing     Upper And Lower    Thyrotoxicosis     Wears glasses        Prior to Admission medications    Medication Sig Start Date End Date Taking?  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 FAMILY HISTORY       Family History   Problem Relation Age of Onset    Diabetes Mother     Cancer Father         Throat Cancer    Heart Disease Father           SOCIAL HISTORY       Social History     Socioeconomic History    Marital status:      Spouse name: None    Number of children: None    Years of education: None    Highest education level: None   Occupational History    None   Social Needs    Financial resource strain: None    Food insecurity     Worry: None     Inability: None    Transportation needs     Medical: None     Non-medical: None   Tobacco Use    Smoking status: Never Smoker    Smokeless tobacco: Current User     Types: Snuff   Substance and Sexual Activity    Alcohol use: Yes     Comment: \"Occ.  Maybe Once A Week\"    Drug use: No    Sexual activity: Yes     Partners: Female   Lifestyle    Physical activity     Days per week: None     Minutes per session: None    Stress: None   Relationships    Social connections     Talks on phone: None     Gets together: None     Attends Islam service: None     Active member of club or organization: None     Attends meetings of clubs or organizations: None     Relationship status: None    Intimate partner violence     Fear of current or ex partner: None     Emotionally abused: None     Physically abused: None     Forced sexual activity: None   Other Topics Concern    None   Social History Narrative    None       SCREENINGS               PHYSICAL EXAM    (up to 7 for level 4, 8 or more for level 5)     ED Triage Vitals   BP Temp Temp Source Pulse Resp SpO2 Height Weight   08/08/20 2200 08/08/20 2200 08/08/20 2200 08/08/20 2200 08/08/20 2200 08/08/20 2200 08/08/20 2158 08/08/20 2158   130/88 98.7 °F (37.1 °C) Oral 69 16 97 % 6' 2\" (1.88 m) 220 lb (99.8 kg)       Physical Exam    DIAGNOSTIC RESULTS     Labs Reviewed   CBC WITH AUTO DIFFERENTIAL - Abnormal; Notable for the following components:       Result Value    Monocytes % 10.1 (*)     All other components within normal limits   COMPREHENSIVE METABOLIC PANEL - Abnormal; Notable for the following components:    Glucose 111 (*)     Calcium 8.1 (*)     All other components within normal limits   TSH WITHOUT REFLEX - Abnormal; Notable for the following components:    TSH, High Sensitivity 8.770 (*)     All other components within normal limits   URINALYSIS - Abnormal; Notable for the following components:    Mucus, UA FEW (*)     All other components within normal limits   BLOOD GAS, VENOUS - Abnormal; Notable for the following components:    pCO2, [address] 59 (*)     Base Exc, Mixed 4.3 (*)     HCO3, Venous 31.8 (*)     O2 Sat, [address] 71.7 (*)     All other components within normal limits   MAGNESIUM   TROPONIN   URINE DRUG SCREEN   BRAIN NATRIURETIC PEPTIDE   T4, FREE   TROPONIN   T3, FREE          EKG: All EKG's are interpreted by the Emergency Department Physician who either signs or Co-signs this chart in the absence of a cardiologist.       EKG Interpretation    Interpreted by emergency department physician    Rhythm: normal sinus   Rate: normal  Axis: normal  Ectopy: none  Conduction: normal  ST Segments: no acute change  T Waves: no acute change  Q Waves: none    Clinical Impression: no acute changes    Edward Tolliver     RADIOLOGY:     Non-plain film images such as CT, Ultrasound and MRI are read by the radiologist. Plain radiographic images are visualized and preliminarily interpreted by the emergency physician. Interpretation per the Radiologist below, if available at the time of this note:    XR CHEST PORTABLE   Final Result   No acute cardiopulmonary abnormality.                ED BEDSIDE ULTRASOUND:   Performed by ED Physician France Truong DO       LABS:  Labs Reviewed   CBC WITH AUTO DIFFERENTIAL - Abnormal; Notable for the following components:       Result Value    Monocytes % 10.1 (*)     All other components within normal limits   COMPREHENSIVE METABOLIC PANEL - Abnormal; Notable for the following components:    Glucose 111 (*)     Calcium 8.1 (*)     All other components within normal limits   TSH WITHOUT REFLEX - Abnormal; Notable for the following components:    TSH, High Sensitivity 8.770 (*)     All other components within normal limits   URINALYSIS - Abnormal; Notable for the following components:    Mucus, UA FEW (*)     All other components within normal limits   BLOOD GAS, VENOUS - Abnormal; Notable for the following components:    pCO2, [address] 59 (*)     Base Exc, Mixed 4.3 (*)     HCO3, Venous 31.8 (*)     O2 Sat, [address] 71.7 (*)     All other components within normal limits   MAGNESIUM   TROPONIN   URINE DRUG SCREEN   BRAIN NATRIURETIC PEPTIDE   T4, FREE   TROPONIN   T3, FREE       All other labs were within normal range or not returned as of this dictation. EMERGENCY DEPARTMENT COURSE and DIFFERENTIAL DIAGNOSIS/MDM:   Vitals:    Vitals:    08/08/20 2158 08/08/20 2200   BP:  130/88   Pulse:  69   Resp:  16   Temp:  98.7 °F (37.1 °C)   TempSrc:  Oral   SpO2:  97%   Weight: 220 lb (99.8 kg)    Height: 6' 2\" (1.88 m)            MDM  Number of Diagnoses or Management Options  Diagnosis management comments: 72-year-old male presents emergency department with chief complaint of palpitations and hypertension. Patient reports this was similar to when he went into atrial fibrillation with RVR in the past.  Patient reported that the previous episode was due to thyrotoxicosis. Thyroid level is unremarkable. Troponins are negative x2. Patient is Wells PE criteria negative. Patient's heart score is 3. Vital signs are normal and stable and patient is not anemic.   Per [facility] syncope rules, patient is considered low risk for adverse outcome. Patient did not syncopized but did report feeling lightheaded during the palpitations. Patient is scheduled to see his cardiologist on Monday. May consider diltiazem or metoprolol at that time. Discussed with the patient return precautions for increased palpitations but patient does not meet criteria for admission at this time. Would also recommend outpatient stress echocardiogram.       Amount and/or Complexity of Data Reviewed  Clinical lab tests: ordered and reviewed  Tests in the radiology section of CPT®: reviewed and ordered  Tests in the medicine section of CPT®: ordered and reviewed    Risk of Complications, Morbidity, and/or Mortality  Presenting problems: moderate  Diagnostic procedures: moderate  Management options: moderate    Critical Care  Total time providing critical care: < 30 minutes    Patient Progress  Patient progress: improved        REASSESSMENT          CRITICAL CARE TIME     Total critical care time provided today was 0 minutes. This excludes seperately billable procedures and family discussion time. Critical care time provided for obtaining history, conducting a physical exam, performing and monitoring interventions, ordering, collecting and interpreting tests, and establishing medical decision-making. There was a potential for life/limb threatening pathology requiring close evaluation and intervention with concern for patient decompensation. CONSULTS:  None    PROCEDURES:  None performed unless otherwise noted below     Procedures        FINAL IMPRESSION      1. Palpitations    2. Hypertension, unspecified type          DISPOSITION/PLAN   DISPOSITION Decision To Discharge 08/09/2020 02:58:16 AM      PATIENT REFERRED TO:  Porter Noble, [facility] [facility] [address] [facility] [address]  [phone]    In 2 days      Lizbeth Damian MD  [address]  [facility] [address]  [phone]    In 2 days        DISCHARGE MEDICATIONS:  New Prescriptions    No medications on file       ED Provider Disposition Time  DISPOSITION Decision To Discharge 08/09/2020 02:58:16 AM      Appropriate personal protective equipment was worn during the patient's evaluation. These included surgical, eye protection, surgical mask or in 95 respirator and gloves. The patient was also placed in a surgical mask for the prevention of possible spread of respiratory viral illnesses. The Patient was instructed to read the package inserts with any medication that was prescribed. Major potential reactions and medication interactions were discussed. The Patient understands that there are numerous possible adverse reactions not covered. The patient was also instructed to arrange follow-up with his or her primary care provider for review of any pending labwork or incidental findings on any radiology results that were obtained. All efforts were made to discuss any incidental findings that require further monitoring. Controlled Substances Monitoring:     No flowsheet data found.     (Please note that portions of this note were completed with a voice recognition program.  Efforts were made to edit the dictations but occasionally words are mis-transcribed.)    John Ballard DO (electronically signed)  Attending Emergency Physician            John Ballard DO  08/09/20 [id number]

## 2020-08-12 LAB
EKG ATRIAL RATE: 69 BPM
EKG DIAGNOSIS: NORMAL
EKG P AXIS: 38 DEGREES
EKG P-R INTERVAL: 174 MS
EKG Q-T INTERVAL: 390 MS
EKG QRS DURATION: 90 MS
EKG QTC CALCULATION (BAZETT): 417 MS
EKG R AXIS: -15 DEGREES
EKG T AXIS: 42 DEGREES
EKG VENTRICULAR RATE: 69 BPM

## 2020-08-13 ENCOUNTER — NURSE ONLY (OUTPATIENT)
Dept: CARDIOLOGY CLINIC | Age: 55
End: 2020-08-13
Payer: COMMERCIAL

## 2020-08-13 ENCOUNTER — OFFICE VISIT (OUTPATIENT)
Dept: CARDIOLOGY CLINIC | Age: 55
End: 2020-08-13
Payer: COMMERCIAL

## 2020-08-13 VITALS
TEMPERATURE: 98.6 F | HEART RATE: 72 BPM | WEIGHT: 223 LBS | DIASTOLIC BLOOD PRESSURE: 86 MMHG | BODY MASS INDEX: 28.62 KG/M2 | HEIGHT: 74 IN | SYSTOLIC BLOOD PRESSURE: 130 MMHG | RESPIRATION RATE: 16 BRPM

## 2020-08-13 PROBLEM — R55 SYNCOPE AND COLLAPSE: Status: ACTIVE | Noted: 2020-08-13

## 2020-08-13 PROCEDURE — 99214 OFFICE O/P EST MOD 30 MIN: CPT | Performed by: INTERNAL MEDICINE

## 2020-08-13 PROCEDURE — 93270 REMOTE 30 DAY ECG REV/REPORT: CPT | Performed by: INTERNAL MEDICINE

## 2020-08-13 NOTE — PROGRESS NOTES
CARDIOLOGY  NOTE    Chief Complaint: atrial fibrillation    HPI:   Venu Ambriz is a 50y.o. year old who has history as noted below I have not seen him since 2018 he comes in now after an event of almost passing out or syncope while he was driving back from work from [facility] where he works at the [employer]. He says he was having episodes of palpitations and started feeling unwell he decided to move his car off the road and then thinks he passed out. He has stopped all his medication including thyroid medication before the event happened and has not taken anything for quite some time now. Venu Ambriz initially was seen for A. fib in the setting of thyrotoxicosis hehe is status post thyroidectomy . he is in sinus rhythm since thyrodectomy. ., Eliquis was stopped after surgery and not restarted. He had  afib/ aflutter , being treated with methamazole for Thyrotoxicosis which has improved after thyroidectomy . He is off all cardiac meds   HE says he has  No chest pain. He has put on about 40 to 50 pounds since his surgery, denies any  palpitations,    He was  seen in the hospital  In August 2017 when he presented with A. Fib. In setting of thyroid storm  TSH was less than 0.01 and T3 was 22,we decided to manage him with rate control strategy until his thyroid issues were resolved. Current Outpatient Medications   Medication Sig Dispense Refill    levothyroxine (SYNTHROID) 125 MCG tablet Take 135 mcg by mouth Daily       aspirin 325 MG EC tablet Take 1 tablet by mouth daily 30 tablet 3     No current facility-administered medications for this visit. Allergies:   Patient has no known allergies. Patient History:  Past Medical History:   Diagnosis Date    [facility])     Sees Dr. Leverette Schilder H/O echocardiogram 08/23/2017    Normal left ventricle size and systolic function. EF 60% Mild to moderate bilateral atrial dilatation.  Moderate MR, TR and mild AR     History of cardiac monitoring 01/23/2018    No afib , normal sinus    Kidney stones Last Time Late 1990's    \"Passed Kidney Stones A Couple Times\"    Narcolepsy     Teeth missing     Upper And Lower    Thyrotoxicosis     Wears glasses      Past Surgical History:   Procedure Laterality Date    DENTAL SURGERY      Teeth Extracted In Past    THYROIDECTOMY  12/18/2017     Family History   Problem Relation Age of Onset    Diabetes Mother     Cancer Father         Throat Cancer    Heart Disease Father      Social History     Tobacco Use    Smoking status: Never Smoker    Smokeless tobacco: Current User     Types: Snuff   Substance Use Topics    Alcohol use: Yes     Comment: \"Occ. Maybe Once A Week\"        Review of Systems:   · Constitutional: No Fever or Weight Loss   · Eyes: No Decreased Vision  · ENT: No Headaches, Hearing Loss or Vertigo  · Cardiovascular: as per note above   · Respiratory: No cough or wheezing and as per note above. · Gastrointestinal: No abdominal pain, appetite loss, blood in stools, constipation, diarrhea or heartburn  · Genitourinary: No dysuria, trouble voiding, or hematuria  · Musculoskeletal:  None  · Integumentary: No rash or pruritis  · Neurological: No TIA or stroke symptoms  · Psychiatric: No anxiety or depression  · Endocrine: No malaise, fatigue or temperature intolerance  · Hematologic/Lymphatic: No bleeding problems, blood clots or swollen lymph nodes  · Allergic/Immunologic: No nasal congestion or hives    Objective:      Physical Exam:  /86   Pulse 72   Temp 98.6 °F (37 °C)   Resp 16   Ht 6' 2\" (1.88 m)   Wt 223 lb (101.2 kg)   BMI 28.63 kg/m²   Wt Readings from Last 3 Encounters:   08/13/20 223 lb (101.2 kg)   08/08/20 220 lb (99.8 kg)   05/10/18 216 lb (98 kg)     Body mass index is 28.63 kg/m².   Vitals:    08/13/20 1509   BP: 130/86   Pulse: 72   Resp: 16   Temp: 98.6 °F (37 °C)        General Appearance:  No distress, noted.   No evidence of pericardial effusion.       All labs, medications and tests reviewed by myself including data and history from outside source , patient and available family . Assessment & Plan:      1. Paroxysmal atrial fibrillation (HCC)    2. Atrial fibrillation with RVR ([address]    3. Hyperthyroidism    4. 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 Typical atrial flutter ([address]    6. Essential hypertension    7. Syncope and collapse         Syncope   He has stopped all medication including thyroid medication TSH is 8 he may be hypothyroid. We will get a treadmill to see if he unmask any arrhythmia also place him on a 30-day event monitor I have asked him not to drive. Get echo   Does not consume significant amount of alcohol, positive marijuana? atrial fibrillation with RVR ([address]  He is in sinus rhythm now. Currently only on aspirin 3-day event monitor in 2018 did not reveal any recurrence of A. fib    Essential hypertension  Blood pressure well controlled except for few numbers in 140's     Hyperthyroidism  Status thyrodectomy , on levothyroxine now,. Sees Dr Avery Clemons . weaned off propranolol     Dyslipidemia :  Maritza Blas had lab work recently,  Lipid profile was reviewed with patient. Counseled extensively and medication compliance urged. Various goals were discussed and questions answered. Continue current medications. Appropriate prescriptions are addressed and refills ordered. Questions answered and patient verbalizes understanding. Call for any problems, questions, or concerns. Continue all other medications of all above medical condition listed as is. Return in about 6 weeks (around 9/24/2020).

## 2020-08-13 NOTE — PROGRESS NOTES
[id number] Score for Atrial Fibrillation Stroke Risk   Risk   Factors  Component Value   C CHF No 0   H HTN Yes 1   A2 Age >= 76 No,  (50 y.o.) 0   D DM No 0   S2 Prior Stroke/TIA No 0   V Vascular Disease No 0   A Age 74-69 No,  (50 y.o.) 0   Sc Sex male 0    [id number]  Score  1   Score last updated 8/13/20 3:67 PM EDT    Click here for a link to the UpToDate guideline \"Atrial Fibrillation: Anticoagulation therapy to prevent embolization    Disclaimer: Risk Score calculation is dependent on accuracy of patient problem list and past encounter diagnosis.

## 2020-08-20 ENCOUNTER — PROCEDURE VISIT (OUTPATIENT)
Dept: CARDIOLOGY CLINIC | Age: 55
End: 2020-08-20
Payer: COMMERCIAL

## 2020-08-20 LAB
LV EF: 58 %
LVEF MODALITY: NORMAL

## 2020-08-20 PROCEDURE — 93306 TTE W/DOPPLER COMPLETE: CPT | Performed by: INTERNAL MEDICINE

## 2020-08-20 PROCEDURE — 93015 CV STRESS TEST SUPVJ I&R: CPT | Performed by: INTERNAL MEDICINE

## 2020-08-24 ENCOUNTER — TELEPHONE (OUTPATIENT)
Dept: CARDIOLOGY CLINIC | Age: 55
End: 2020-08-24

## 2020-08-24 NOTE — TELEPHONE ENCOUNTER
Left VM that echo was normal.        Summary   Left ventricular systolic function is normal with an ejection fraction of   55-60%. No significant valvular disease or diastolic dysfunction noted.    Essentially normal echocardiogram.

## 2020-09-28 PROCEDURE — 93272 ECG/REVIEW INTERPRET ONLY: CPT | Performed by: INTERNAL MEDICINE

## 2020-10-01 ENCOUNTER — VIRTUAL VISIT (OUTPATIENT)
Dept: CARDIOLOGY CLINIC | Age: 55
End: 2020-10-01
Payer: COMMERCIAL

## 2020-10-01 PROCEDURE — 99442 PR PHYS/QHP TELEPHONE EVALUATION 11-20 MIN: CPT | Performed by: INTERNAL MEDICINE

## 2020-10-01 NOTE — PROGRESS NOTES
CARDIOLOGY  NOTE       Aryan Jarvis is a 50 y.o. male evaluated via telephone on 10/1/2020. Documentation:  I communicated with the patient and/or health care decision maker about their current complaints and mentioned below   Details of this discussion including any medical advice provided:as below     I Confirm this is a Patient Initiated Episode with an Established Patient who has not had a related appointment within my department in the past 7 days or scheduled within the next 24 hours. The call was not tied to face to face office visit or procedure that has occurred in in prior 7 days. Based on our conversation /evaluation , a subsequent office visit for the patient's problem is not indicated for  24 hrs     Total Time: minutes: 11-20 minutes    Note: not billable if this call serves to triage the patient into an appointment for the relevant concern          Chief Complaint: atrial fibrillation    HPI:   Harish Eric is a 50y.o. year old who has history as noted below. He has not had any more passing out event since July 2020 when he  almost passed  out or syncope while he was driving back from work from [place] where he works at the [employer]. He says he was having episodes of palpitations and started feeling unwell he decided to move his car off the road and then thinks he passed out. He has not had any more events since then. On event monitor in August 2020 he had no arrhythmias , on treadmill he had no arrhythmias. Echo was normal    He has stopped all his medication including thyroid medication before the event happened and has not taken anything for quite some time now. Harish Eric initially was seen for A. fib in the setting of thyrotoxicosis he is status post thyroidectomy . he is in sinus rhythm since thyrodectomy. ., Eliquis was stopped after surgery and not restarted. He had  afib/ aflutter , with  Thyrotoxicosis which has improved after thyroidectomy . He is off all cardiac meds   HE says he has  No chest pain. He has put on about 40 to 50 pounds since his surgery, denies any  palpitations,    He was  seen in the hospital  In August 2017 when he presented with ISAEBL. Fib. In setting of thyroid storm  TSH was less than 0.01 and T3 was 22,we decided to manage him with rate control strategy until his thyroid issues were resolved. Current Outpatient Medications   Medication Sig Dispense Refill    levothyroxine (SYNTHROID) 125 MCG tablet Take 135 mcg by mouth Daily       aspirin 325 MG EC tablet Take 1 tablet by mouth daily 30 tablet 3     No current facility-administered medications for this visit. Allergies:   Patient has no known allergies. Patient History:  Past Medical History:   Diagnosis Date    A-fib [facility])     Sees Dr. Yaneli Herrera H/O echocardiogram 08/23/2017    Normal left ventricle size and systolic function. EF 60% Mild to moderate bilateral atrial dilatation. Moderate MR, TR and mild AR     H/O echocardiogram 08/20/2020    EF 55-60%, normal study.  History of cardiac monitoring 01/23/2018    No afib , normal sinus    History of cardiac monitoring 08/26/2020    Normal sinus     Kidney stones Last Time Late 1990's    \"Passed Kidney Stones A Couple Times\"    Narcolepsy     Teeth missing     Upper And Lower    Thyrotoxicosis     Wears glasses      Past Surgical History:   Procedure Laterality Date    DENTAL SURGERY      Teeth Extracted In Past    THYROIDECTOMY  12/18/2017     Family History   Problem Relation Age of Onset    Diabetes Mother     Cancer Father         Throat Cancer    Heart Disease Father      Social History     Tobacco Use    Smoking status: Never Smoker    Smokeless tobacco: Current User     Types: Snuff   Substance Use Topics    Alcohol use: Yes     Comment: \"Occ.  Maybe Once A Week\"        Review of Systems:   · Constitutional: No Fever or Weight Loss   · Eyes: No Decreased Vision  · ENT: No Headaches, Hearing Loss or Vertigo  · Cardiovascular: as per note above   · Respiratory: No cough or wheezing and as per note above. · Gastrointestinal: No abdominal pain, appetite loss, blood in stools, constipation, diarrhea or heartburn  · Genitourinary: No dysuria, trouble voiding, or hematuria  · Musculoskeletal:  None  · Integumentary: No rash or pruritis  · Neurological: No TIA or stroke symptoms  · Psychiatric: No anxiety or depression  · Endocrine: No malaise, fatigue or temperature intolerance  · Hematologic/Lymphatic: No bleeding problems, blood clots or swollen lymph nodes  · Allergic/Immunologic: No nasal congestion or hives    Objective:      Physical Exam:  There were no vitals taken for this visit. Wt Readings from Last 3 Encounters:   08/13/20 223 lb (101.2 kg)   08/08/20 220 lb (99.8 kg)   05/10/18 216 lb (98 kg)     There is no height or weight on file to calculate BMI. There were no vitals filed for this visit. No exam due to telephone call         Medical decision making and Data review:  DATA:  Lab Results   Component Value Date    TROPONINT <0.010 08/09/2020     BNP:    Lab Results   Component Value Date    PROBNP 45.37 08/08/2020     PT/INR:  No results found for: PTINR  No results found for: LABA1C  Lab Results   Component Value Date    CHOL 217 11/03/2017    TRIG 58 11/03/2017    HDL 70 11/03/2017    LDLCALC 135 11/03/2017     Lab Results   Component Value Date    ALT 16 08/08/2020    AST 22 08/08/2020     Echo: 8/23/17  Summary   Normal left ventricle size and systolic function.  EF is 60 %   Mild to moderate bilateral atrial dilatation.   Right ventricular systolic pressure of 43 mm Hg consistent with moderate   pulmonary hypertension.   Moderate mitral, tricuspid and trace pulmonic regurgitation is present.   Mild aortic regurgitation is noted.   No evidence of pericardial effusion.     Stress test  8/20/20   Stress Type: Exercise      Peak HR: 166 bpm                       HR Response: Appropriate   Peak BP: 148/80 mmHg                   BP Response: Normal resting BP -   Predicted HR: 167 bpm                  appropriate response   % of predicted HR: 99                  HR BP Product: 64026   Exercise Duration: 09:59 min           Max Exercise: 11.7 METS   Reason for Termination: Target heart    Echo 8/2020   Left ventricular systolic function is normal with an ejection fraction of   55-60%. No significant valvular disease or diastolic dysfunction noted. Essentially normal echocardiogram.      All labs, medications and tests reviewed by myself including data and history from outside source , patient and available family . Assessment & Plan:      1. Typical atrial flutter ([address]    2. Atrial fibrillation with RVR ([address]    3. Essential hypertension         Syncope   He has stopped all medication including thyroid medication TSH is 8 he may be hypothyroid. Stress/ echo are normal , encouraged to take his thyroid meds   Does not consume significant amount of alcohol, positive marijuana? atrial fibrillation with RVR ([address]  He is in sinus rhythm now. Currently only on aspirin 3-day event monitor in 2018 and 2020  did not reveal any recurrence of A. fib    Essential hypertension  Blood pressure well controlled except for few numbers in 140's     Hyperthyroidism  Status thyrodectomy , on levothyroxine now but compliance a concern,. Sees Dr James Lee . weaned off propranolol     Dyslipidemia :  Reg Box had lab work recently,  Lipid profile was reviewed with patient. Counseled extensively and medication compliance urged. Various goals were discussed and questions answered. Continue current medications. Appropriate prescriptions are addressed and refills ordered. Questions answered and patient verbalizes understanding. Call for any problems, questions, or concerns. Continue all other medications of all above medical condition listed as is.     Return in about 6 months (around 4/1/2021).

## 2021-08-18 ENCOUNTER — APPOINTMENT (OUTPATIENT)
Dept: GENERAL RADIOLOGY | Age: 56
End: 2021-08-18
Payer: COMMERCIAL

## 2021-08-18 ENCOUNTER — HOSPITAL ENCOUNTER (EMERGENCY)
Age: 56
Discharge: HOME | End: 2021-08-18
Payer: COMMERCIAL

## 2021-08-18 VITALS
BODY MASS INDEX: 28.23 KG/M2 | DIASTOLIC BLOOD PRESSURE: 99 MMHG | TEMPERATURE: 98.3 F | SYSTOLIC BLOOD PRESSURE: 147 MMHG | HEART RATE: 87 BPM | HEIGHT: 74 IN | OXYGEN SATURATION: 98 % | RESPIRATION RATE: 16 BRPM | WEIGHT: 220 LBS

## 2021-08-18 DIAGNOSIS — M25.562 ACUTE PAIN OF LEFT KNEE: Primary | ICD-10-CM

## 2021-08-18 PROCEDURE — 96372 THER/PROPH/DIAG INJ SC/IM: CPT

## 2021-08-18 PROCEDURE — 99283 EMERGENCY DEPT VISIT LOW MDM: CPT

## 2021-08-18 PROCEDURE — 73564 X-RAY EXAM KNEE 4 OR MORE: CPT

## 2021-08-18 PROCEDURE — 6360000002 HC RX W HCPCS: Performed by: PHYSICIAN ASSISTANT

## 2021-08-18 RX ORDER — MELOXICAM 7.5 MG/1
15 TABLET ORAL DAILY
Qty: 20 TABLET | Refills: 0 | Status: SHIPPED | OUTPATIENT
Start: 2021-08-18 | End: 2021-08-28

## 2021-08-18 RX ORDER — KETOROLAC TROMETHAMINE 30 MG/ML
15 INJECTION, SOLUTION INTRAMUSCULAR; INTRAVENOUS ONCE
Status: COMPLETED | OUTPATIENT
Start: 2021-08-18 | End: 2021-08-18

## 2021-08-18 RX ADMIN — KETOROLAC TROMETHAMINE 15 MG: 30 INJECTION, SOLUTION INTRAMUSCULAR; INTRAVENOUS at 09:42

## 2021-08-18 ASSESSMENT — PAIN SCALES - GENERAL
PAINLEVEL_OUTOF10: 10
PAINLEVEL_OUTOF10: 9

## 2021-08-18 ASSESSMENT — PAIN DESCRIPTION - PAIN TYPE: TYPE: ACUTE PAIN

## 2021-08-18 ASSESSMENT — PAIN DESCRIPTION - LOCATION: LOCATION: KNEE

## 2021-08-18 ASSESSMENT — PAIN DESCRIPTION - FREQUENCY: FREQUENCY: CONTINUOUS

## 2021-08-18 NOTE — LETTER
[facility] Emergency Department  [address] [address]  Phone: [phone]  Fax: [phone]               August 19, 2021    Patient: Oracio Grande   YOB: 1965   Date of Visit: 8/18/2021       To Whom It May Concern:    Oracio Grande was seen and treated in our emergency department on 8/18/2021. He may return to work on 8/21/2021. Sincerely,       Pito Gregorio         Signature:__________________________________

## 2021-08-18 NOTE — ED NOTES
Discharge instructions reviewed with patient. follow up was discussed.  Patient denies further questions and verbalizes understanding     Adama Mcdonald RN  08/18/21 [id number]

## 2021-08-18 NOTE — ED PROVIDER NOTES
EMERGENCY DEPARTMENT ENCOUNTER      PCP: Miles Dacosta MD    CHIEF COMPLAINT    Chief Complaint   Patient presents with    Knee Pain     left     This patient was not evaluated by the attending physician. I have independently evaluated this patient . HPI    Tracey Cabrera is a 49 y.o. male who presents to the emergency department today with a chief complaint of left-sided knee pain. Patient states he was jumping off of his tractor yesterday and landed on his knee awkwardly. He states symptom he has had a worsening pain and swelling to the knee. Had difficulty bearing weight. No history of knee injuries. No other lower leg injuries, no distal numbness tiling weakness. REVIEW OF SYSTEMS    General: Denies fever or chills  Cardiac: Denies chest pain  Pulmonary: Denies shortness of breath  GI: Denies abdominal pain, vomiting, or diarrhea  : No dysuria or hematuria  Denies any other muscles skeletal injuries, including chest wall and back. All other review of systems are negative  See HPI and nursing notes for additional information     PAST MEDICAL & SURGICAL HISTORY    Past Medical History:   Diagnosis Date    A-fib [facility])     Sees Dr. Dany Hurd H/O echocardiogram 08/23/2017    Normal left ventricle size and systolic function. EF 60% Mild to moderate bilateral atrial dilatation. Moderate MR, TR and mild AR     H/O echocardiogram 08/20/2020    EF 55-60%, normal study.     History of cardiac monitoring 01/23/2018    No afib , normal sinus    History of cardiac monitoring 08/26/2020    Normal sinus     Kidney stones Last Time Late 1990's    \"Passed Kidney Stones A Couple Times\"    Narcolepsy     Teeth missing     Upper And Lower    Thyrotoxicosis     Wears glasses      Past Surgical History:   Procedure Laterality Date    DENTAL SURGERY      Teeth Extracted In Past    THYROIDECTOMY  12/18/2017       CURRENT MEDICATIONS    Current Outpatient Rx   Medication Sig Dispense Refill    meloxicam (MOBIC) 7.5 MG tablet Take 2 tablets by mouth daily for 10 days 20 tablet 0    levothyroxine (SYNTHROID) 125 MCG tablet Take 135 mcg by mouth Daily       aspirin 325 MG EC tablet Take 1 tablet by mouth daily 30 tablet 3       ALLERGIES    No Known Allergies    SOCIAL & FAMILY HISTORY    Social History     Socioeconomic History    Marital status:      Spouse name: Not on file    Number of children: Not on file    Years of education: Not on file    Highest education level: Not on file   Occupational History    Not on file   Tobacco Use    Smoking status: Never Smoker    Smokeless tobacco: Current User     Types: Snuff   Vaping Use    Vaping Use: Never used   Substance and Sexual Activity    Alcohol use: Yes     Comment: \"Occ. Maybe Once A Week\"    Drug use: No    Sexual activity: Yes     Partners: Female   Other Topics Concern    Not on file   Social History Narrative    Not on file     Social Determinants of Health     Financial Resource Strain:     Difficulty of Paying Living Expenses:    Food Insecurity:     Worried About Running Out of Food in the Last Year:     [address] in the Last Year:    Transportation Needs:     Lack of Transportation (Medical):      Lack of Transportation (Non-Medical):    Physical Activity:     Days of Exercise per Week:     Minutes of Exercise per Session:    Stress:     Feeling of Stress :    Social Connections:     Frequency of Communication with Friends and Family:     Frequency of Social Gatherings with Friends and Family:     Attends Yarsani Services:     Active Member of Clubs or Organizations:     Attends Club or Organization Meetings:     Marital Status:    Intimate Partner Violence:     Fear of Current or Ex-Partner:     Emotionally Abused:     Physically Abused:     Sexually Abused:      Family History   Problem Relation Age of Onset    Diabetes Mother     Cancer Father         Throat Cancer    Heart Disease Father PHYSICAL EXAM    VITAL SIGNS: BP (!) 147/99   Pulse 87   Temp 98.3 °F (36.8 °C) (Oral)   Resp 16   Ht 6' 2\" (1.88 m)   Wt 220 lb (99.8 kg)   SpO2 98%   BMI 28.25 kg/m²   Constitutional:  Well developed, Appears comfortable    Musculoskeletal:    Left knee exam:      -Inspection:  No obvious defects or deformities, skin intact   - Palpation: No redness, or warmth      No effusion     No joint line, parapatellar, pes region tenderness. -ROM:  Extension - Has full extension (Extensor mechanism intact)    Flexion - Mildly limited due pain   -Provocative tests:  Negative Anterior Drawer, Mcmurrays, Carlton. No varus / valgus laxity. No swelling, discoloration, tenderness to palpation of lower leg, or range of motion deficit of the ipsilateral hip and ankle  Vascular: Distal pulses (DP, PT) intact on affected side. Capillary refill intact. Integument:  Well hydrated, no rash   Neurologic:  Awake and alert, normal flow of speech. Distal sensation, motor intact. Psychiatric: Cooperative, pleasant affect    RADIOLOGY/PROCEDURES    XR KNEE LEFT (MIN 4 VIEWS)    Result Date: 8/18/2021  EXAMINATION: FOUR XRAY VIEWS OF THE LEFT KNEE 8/18/2021 7:43 am COMPARISON: None. HISTORY: ORDERING SYSTEM PROVIDED HISTORY: stepped off the mower TECHNOLOGIST PROVIDED HISTORY: Reason for exam:->stepped off the mower Reason for Exam: left ankle pain  / stepped off the mower and fell Acuity: Acute Type of Exam: Initial FINDINGS: There is no evidence of fracture, malalignment, or other acute osseous abnormality. No acute osseous abnormality. ED COURSE & MEDICAL DECISION MAKING      Patient presents as above. Emergent etiologies considered. Patient seen and examined. Having left-sided knee pain after jumping off of his tractor. X-ray imaging acutely negative. No obvious laxity of the knee, is neurovascularly intact. Will be given NSAIDs, conservative management, to follow-up with orthopedic.   He will be given

## 2021-08-18 NOTE — LETTER
[facility] Emergency Department  [address] [address]  Phone: [phone]  Fax: [phone]             August 18, 2021    Patient: Lovely Booker   YOB: 1965   Date of Visit: 8/18/2021       To Whom It May Concern:    Lovely Booker was seen and treated in our emergency department on 8/18/2021. He may return to work on 8/20/2021.       Sincerely,     Nurse        Signature:__________________________________

## 2021-08-27 ENCOUNTER — OFFICE VISIT (OUTPATIENT)
Dept: ORTHOPEDIC SURGERY | Age: 56
End: 2021-08-27
Payer: COMMERCIAL

## 2021-08-27 VITALS — BODY MASS INDEX: 28.23 KG/M2 | WEIGHT: 220 LBS | RESPIRATION RATE: 16 BRPM | HEIGHT: 74 IN

## 2021-08-27 DIAGNOSIS — S80.02XA CONTUSION OF LEFT KNEE, INITIAL ENCOUNTER: Primary | ICD-10-CM

## 2021-08-27 PROCEDURE — 99202 OFFICE O/P NEW SF 15 MIN: CPT | Performed by: PHYSICIAN ASSISTANT

## 2021-08-27 RX ORDER — LEVOTHYROXINE SODIUM 0.15 MG/1
150 TABLET ORAL DAILY
COMMUNITY

## 2021-08-27 ASSESSMENT — ENCOUNTER SYMPTOMS
EYES NEGATIVE: 1
GASTROINTESTINAL NEGATIVE: 1
RESPIRATORY NEGATIVE: 1

## 2021-08-27 NOTE — PATIENT INSTRUCTIONS
Continue to weight bear as tolerated  Continue range of motion  Ice and elevate as needed  Tylenol or Motrin for pain  Follow up as needed    Call the office if the pain is persistent pain we will order and MRI of the left knee

## 2021-08-27 NOTE — LETTER
[facility]  [address] [place] [address]  Phone: [phone]  Fax: [phone]    Roney Pruitt        August 27, 2021     Patient: Lawence Lanes   YOB: 1965   Date of Visit: 8/27/2021       To Whom It May Concern: It is my medical opinion that Lawence Lanes. Patient can return to work on 08/30/2021. Yancey Severs may return to full duty immediately with no restrictions. If you have any questions or concerns, please don't hesitate to call.     Sincerely,        Jocelyn Restrepo PA-C

## 2021-08-27 NOTE — PROGRESS NOTES
I reviewed and agree with the portions of the HPI, review of systems, vital documentation and plan performed by my staff and have added/addended where appropriate. Review of Systems   Constitutional: Negative. HENT: Negative. Eyes: Negative. Respiratory: Negative. Cardiovascular: Negative. Gastrointestinal: Negative. Genitourinary: Negative. Musculoskeletal: Positive for arthralgias. Skin: Negative. Neurological: Negative. Psychiatric/Behavioral: Negative. HPI:  Andi Ventura is a 49 y.o. male that presents the office today after he sustained an injury to the left knee when he jumped off of his lawnmower and landed on the left leg jamming all of his weight on the foot up into the knee. He states that he had sharp pain into the knee and at first he was having difficulty putting any weight on the knee. He did get x-rays of the knee which were negative. He states that the pain is actually resolving or at least getting better now. His pain at a 3/10. Past Medical History:   Diagnosis Date    A-fib [facility])     Sees Dr. Sears Salisbury H/O echocardiogram 08/23/2017    Normal left ventricle size and systolic function. EF 60% Mild to moderate bilateral atrial dilatation. Moderate MR, TR and mild AR     H/O echocardiogram 08/20/2020    EF 55-60%, normal study.     History of cardiac monitoring 01/23/2018    No afib , normal sinus    History of cardiac monitoring 08/26/2020    Normal sinus     Kidney stones Last Time Late 1990's    \"Passed Kidney Stones A Couple Times\"    Narcolepsy     Teeth missing     Upper And Lower    Thyrotoxicosis     Wears glasses        Past Surgical History:   Procedure Laterality Date    DENTAL SURGERY      Teeth Extracted In Past    THYROIDECTOMY  12/18/2017       Family History   Problem Relation Age of Onset    Diabetes Mother     Cancer Father         Throat Cancer    Heart Disease Father        Social History     Socioeconomic History    Marital status:      Spouse name: None    Number of children: None    Years of education: None    Highest education level: None   Occupational History    None   Tobacco Use    Smoking status: Never Smoker    Smokeless tobacco: Current User     Types: Snuff   Vaping Use    Vaping Use: Never used   Substance and Sexual Activity    Alcohol use: Yes     Comment: \"Occ. Maybe Once A Week\"    Drug use: No    Sexual activity: Yes     Partners: Female   Other Topics Concern    None   Social History Narrative    None     Social Determinants of Health     Financial Resource Strain:     Difficulty of Paying Living Expenses:    Food Insecurity:     Worried About Running Out of Food in the Last Year:     [address] in the Last Year:    Transportation Needs:     Lack of Transportation (Medical):  Lack of Transportation (Non-Medical):    Physical Activity:     Days of Exercise per Week:     Minutes of Exercise per Session:    Stress:     Feeling of Stress :    Social Connections:     Frequency of Communication with Friends and Family:     Frequency of Social Gatherings with Friends and Family:     Attends [employer] Services:     Active Member of Clubs or Organizations:     Attends Club or Organization Meetings:     Marital Status:    Intimate Partner Violence:     Fear of Current or Ex-Partner:     Emotionally Abused:     Physically Abused:     Sexually Abused:        Current Outpatient Medications   Medication Sig Dispense Refill    levothyroxine (SYNTHROID) 150 MCG tablet Take 150 mcg by mouth Daily      meloxicam (MOBIC) 7.5 MG tablet Take 2 tablets by mouth daily for 10 days 20 tablet 0    aspirin 325 MG EC tablet Take 1 tablet by mouth daily 30 tablet 3     No current facility-administered medications for this visit.        No Known Allergies    Review of Systems:  See above      Physical Exam:   Resp 16   Ht 6' 2\" (1.88 m)   Wt 220 lb (99.8 kg)   BMI 28.25 kg/m² Gait is Normal. The patient can bear weight on the injured extremity. Gen/Psych:Examination reveals a pleasant individual in no acute distress. The patient is oriented to time, place and person. The patient's mood and affect are appropriate. Patient appears well nourished. Body habitus is normal     Lymph:  mild lymphedema in bilateral lower extremities     Skin intact in bilateral lower extremities with no ulcerations, lesions, rash, erythema. Vascular: There are no varicosities in bilateral lower extremities, sensation intact to light touch over bilateral lower extremities. left leg/knee exam:  Leg alignment:     neutral  Quadriceps/hamstring atrophy:   no  Knee effusion:    no   Knee erythema:   no  ROM:     full  Lachman:    no  Pivot Shift:    no  Posterior drawer:   no    Varus laxity at 0 and 30 deg's: no  Valguslaxity at 0 and 30 deg's: no  Recurvatum:    no  Tenderness at:   none  Knee strength is 5/5 flexion and extension  There is + L2-S1 motor and sensory function. Outside record review: ER records    Imaging Studies:  4 views of the left knee taken and reviewed in the office today show no significant degenerative changes, no acute fractures, no dislocations, no significant joint effusion. The official read and interpretation of these x-rays will be done by the the [address] [place] [address] [facility]         Impression:     Diagnosis Orders   1.  Contusion of left knee, initial encounter           Plan:    Patient Instructions   Continue to weight bear as tolerated  Continue range of motion  Ice and elevate as needed  Tylenol or Motrin for pain  Follow up as needed    Call the office if the pain is persistent pain we will order and MRI of the left knee

## 2023-06-29 ENCOUNTER — APPOINTMENT (OUTPATIENT)
Dept: GENERAL RADIOLOGY | Age: 58
End: 2023-06-29
Payer: COMMERCIAL

## 2023-06-29 ENCOUNTER — HOSPITAL ENCOUNTER (EMERGENCY)
Age: 58
Discharge: HOME | End: 2023-06-30
Attending: EMERGENCY MEDICINE
Payer: COMMERCIAL

## 2023-06-29 VITALS
DIASTOLIC BLOOD PRESSURE: 90 MMHG | HEIGHT: 74 IN | TEMPERATURE: 98.3 F | BODY MASS INDEX: 29.52 KG/M2 | OXYGEN SATURATION: 96 % | RESPIRATION RATE: 17 BRPM | HEART RATE: 65 BPM | SYSTOLIC BLOOD PRESSURE: 147 MMHG | WEIGHT: 230 LBS

## 2023-06-29 DIAGNOSIS — R07.9 CHEST PAIN, UNSPECIFIED TYPE: Primary | ICD-10-CM

## 2023-06-29 DIAGNOSIS — R42 INTERMITTENT LIGHTHEADEDNESS: ICD-10-CM

## 2023-06-29 DIAGNOSIS — R03.0 ELEVATED BLOOD PRESSURE READING WITHOUT DIAGNOSIS OF HYPERTENSION: ICD-10-CM

## 2023-06-29 LAB
ALBUMIN SERPL-MCNC: 4.7 GM/DL (ref 3.4–5)
ALP BLD-CCNC: 58 IU/L (ref 40–129)
ALT SERPL-CCNC: 29 U/L (ref 10–40)
ANION GAP SERPL CALCULATED.3IONS-SCNC: 14 MMOL/L (ref 4–16)
AST SERPL-CCNC: 25 IU/L (ref 15–37)
BASOPHILS ABSOLUTE: 0 K/CU MM
BASOPHILS RELATIVE PERCENT: 0.6 % (ref 0–1)
BILIRUB SERPL-MCNC: 0.3 MG/DL (ref 0–1)
BUN SERPL-MCNC: 10 MG/DL (ref 6–23)
CALCIUM SERPL-MCNC: 7.4 MG/DL (ref 8.3–10.6)
CHLORIDE BLD-SCNC: 103 MMOL/L (ref 99–110)
CO2: 23 MMOL/L (ref 21–32)
CREAT SERPL-MCNC: 1 MG/DL (ref 0.9–1.3)
DIFFERENTIAL TYPE: ABNORMAL
EOSINOPHILS ABSOLUTE: 0.2 K/CU MM
EOSINOPHILS RELATIVE PERCENT: 3.3 % (ref 0–3)
GFR SERPL CREATININE-BSD FRML MDRD: >60 ML/MIN/1.73M2
GLUCOSE SERPL-MCNC: 106 MG/DL (ref 70–99)
HCT VFR BLD CALC: 44.5 % (ref 42–52)
HEMOGLOBIN: 14.7 GM/DL (ref 13.5–18)
IMMATURE NEUTROPHIL %: 0.2 % (ref 0–0.43)
LYMPHOCYTES ABSOLUTE: 1.8 K/CU MM
LYMPHOCYTES RELATIVE PERCENT: 28.1 % (ref 24–44)
MAGNESIUM: 2 MG/DL (ref 1.8–2.4)
MCH RBC QN AUTO: 27.6 PG (ref 27–31)
MCHC RBC AUTO-ENTMCNC: 33 % (ref 32–36)
MCV RBC AUTO: 83.5 FL (ref 78–100)
MONOCYTES ABSOLUTE: 0.7 K/CU MM
MONOCYTES RELATIVE PERCENT: 10.3 % (ref 0–4)
NUCLEATED RBC %: 0 %
PDW BLD-RTO: 14.1 % (ref 11.7–14.9)
PLATELET # BLD: 194 K/CU MM (ref 140–440)
PMV BLD AUTO: 9.1 FL (ref 7.5–11.1)
POTASSIUM SERPL-SCNC: 3.6 MMOL/L (ref 3.5–5.1)
PRO-BNP: 59.91 PG/ML
RBC # BLD: 5.33 M/CU MM (ref 4.6–6.2)
SEGMENTED NEUTROPHILS ABSOLUTE COUNT: 3.6 K/CU MM
SEGMENTED NEUTROPHILS RELATIVE PERCENT: 57.5 % (ref 36–66)
SODIUM BLD-SCNC: 140 MMOL/L (ref 135–145)
T4 FREE SERPL-MCNC: 1.54 NG/DL (ref 0.9–1.8)
TOTAL IMMATURE NEUTOROPHIL: 0.01 K/CU MM
TOTAL NUCLEATED RBC: 0 K/CU MM
TOTAL PROTEIN: 6.8 GM/DL (ref 6.4–8.2)
TROPONIN T: <0.01 NG/ML
TROPONIN T: <0.01 NG/ML
TSH SERPL DL<=0.005 MIU/L-ACNC: 1.96 UIU/ML (ref 0.27–4.2)
WBC # BLD: 6.3 K/CU MM (ref 4–10.5)

## 2023-06-29 PROCEDURE — 80053 COMPREHEN METABOLIC PANEL: CPT

## 2023-06-29 PROCEDURE — 93005 ELECTROCARDIOGRAM TRACING: CPT | Performed by: EMERGENCY MEDICINE

## 2023-06-29 PROCEDURE — 85025 COMPLETE CBC W/AUTO DIFF WBC: CPT

## 2023-06-29 PROCEDURE — 83735 ASSAY OF MAGNESIUM: CPT

## 2023-06-29 PROCEDURE — 83880 ASSAY OF NATRIURETIC PEPTIDE: CPT

## 2023-06-29 PROCEDURE — 84443 ASSAY THYROID STIM HORMONE: CPT

## 2023-06-29 PROCEDURE — 99285 EMERGENCY DEPT VISIT HI MDM: CPT

## 2023-06-29 PROCEDURE — 84439 ASSAY OF FREE THYROXINE: CPT

## 2023-06-29 PROCEDURE — 71045 X-RAY EXAM CHEST 1 VIEW: CPT

## 2023-06-29 PROCEDURE — 6370000000 HC RX 637 (ALT 250 FOR IP): Performed by: EMERGENCY MEDICINE

## 2023-06-29 PROCEDURE — 84484 ASSAY OF TROPONIN QUANT: CPT

## 2023-06-29 RX ORDER — ASPIRIN 81 MG/1
324 TABLET, CHEWABLE ORAL ONCE
Status: COMPLETED | OUTPATIENT
Start: 2023-06-29 | End: 2023-06-29

## 2023-06-29 RX ORDER — 0.9 % SODIUM CHLORIDE 0.9 %
1000 INTRAVENOUS SOLUTION INTRAVENOUS ONCE
Status: DISCONTINUED | OUTPATIENT
Start: 2023-06-29 | End: 2023-06-29

## 2023-06-29 RX ORDER — ACETAMINOPHEN 325 MG/1
650 TABLET ORAL ONCE
Status: COMPLETED | OUTPATIENT
Start: 2023-06-29 | End: 2023-06-29

## 2023-06-29 RX ADMIN — ACETAMINOPHEN 650 MG: 325 TABLET ORAL at 22:44

## 2023-06-29 RX ADMIN — ASPIRIN 324 MG: 81 TABLET, CHEWABLE ORAL at 22:45

## 2023-06-29 ASSESSMENT — PAIN SCALES - GENERAL
PAINLEVEL_OUTOF10: 3
PAINLEVEL_OUTOF10: 0

## 2023-06-29 ASSESSMENT — PAIN DESCRIPTION - PAIN TYPE: TYPE: ACUTE PAIN

## 2023-06-29 ASSESSMENT — PAIN DESCRIPTION - LOCATION
LOCATION: CHEST
LOCATION: CHEST

## 2023-06-29 ASSESSMENT — PAIN DESCRIPTION - ORIENTATION
ORIENTATION: LEFT;UPPER
ORIENTATION: LEFT

## 2023-06-29 ASSESSMENT — PAIN DESCRIPTION - DESCRIPTORS: DESCRIPTORS: DULL

## 2023-06-29 ASSESSMENT — HEART SCORE: ECG: 1

## 2023-06-30 LAB
EKG ATRIAL RATE: 87 BPM
EKG DIAGNOSIS: NORMAL
EKG P AXIS: 40 DEGREES
EKG P-R INTERVAL: 172 MS
EKG Q-T INTERVAL: 374 MS
EKG QRS DURATION: 90 MS
EKG QTC CALCULATION (BAZETT): 450 MS
EKG R AXIS: -35 DEGREES
EKG T AXIS: 26 DEGREES
EKG VENTRICULAR RATE: 87 BPM

## 2023-06-30 PROCEDURE — 93010 ELECTROCARDIOGRAM REPORT: CPT | Performed by: INTERNAL MEDICINE

## 2023-07-19 ENCOUNTER — OFFICE VISIT (OUTPATIENT)
Dept: CARDIOLOGY CLINIC | Age: 58
End: 2023-07-19
Payer: COMMERCIAL

## 2023-07-19 VITALS
DIASTOLIC BLOOD PRESSURE: 104 MMHG | BODY MASS INDEX: 31.16 KG/M2 | WEIGHT: 242.8 LBS | HEIGHT: 74 IN | HEART RATE: 75 BPM | SYSTOLIC BLOOD PRESSURE: 162 MMHG

## 2023-07-19 DIAGNOSIS — I48.3 TYPICAL ATRIAL FLUTTER (HCC): ICD-10-CM

## 2023-07-19 DIAGNOSIS — R07.2 PRECORDIAL PAIN: ICD-10-CM

## 2023-07-19 DIAGNOSIS — I48.91 ATRIAL FIBRILLATION WITH RVR (HCC): ICD-10-CM

## 2023-07-19 DIAGNOSIS — R07.9 CHEST PAIN, UNSPECIFIED TYPE: Primary | ICD-10-CM

## 2023-07-19 DIAGNOSIS — E05.90 HYPERTHYROIDISM: ICD-10-CM

## 2023-07-19 DIAGNOSIS — M79.89 LEG SWELLING: ICD-10-CM

## 2023-07-19 DIAGNOSIS — I10 ESSENTIAL HYPERTENSION: Chronic | ICD-10-CM

## 2023-07-19 PROCEDURE — 3080F DIAST BP >= 90 MM HG: CPT | Performed by: INTERNAL MEDICINE

## 2023-07-19 PROCEDURE — 3077F SYST BP >= 140 MM HG: CPT | Performed by: INTERNAL MEDICINE

## 2023-07-19 PROCEDURE — 93000 ELECTROCARDIOGRAM COMPLETE: CPT | Performed by: INTERNAL MEDICINE

## 2023-07-19 PROCEDURE — 99214 OFFICE O/P EST MOD 30 MIN: CPT | Performed by: INTERNAL MEDICINE

## 2023-07-19 RX ORDER — LOSARTAN POTASSIUM AND HYDROCHLOROTHIAZIDE 12.5; 5 MG/1; MG/1
1 TABLET ORAL DAILY
Qty: 90 TABLET | Refills: 1 | Status: SHIPPED | OUTPATIENT
Start: 2023-07-19

## 2023-07-19 NOTE — PROGRESS NOTES
CARDIOLOGY  NOTE                 Chief Complaint: atrial fibrillation    HPI:   Albert Baer is a 66y.o. year old who has history as noted below. 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 He says he has been having chest pain 2/10 pain  left side non radiating for last few days to weeks , the pain is constant and not incited by any particular activity   Blood pressure has been running high in 180's   Legs have chauncey swollen for last few years ever since his episode of Thyrotoxicosis   He has stopped all his medication except thyroid medication before the event happened and has not taken anything for quite some time now. Albert Baer initially was seen for A. fib in the setting of thyrotoxicosis he is status post thyroidectomy . he is in sinus rhythm since thyrodectomy. ., Eliquis was stopped after surgery and not restarted. He had  afib/ aflutter , with  Thyrotoxicosis which has improved after thyroidectomy . He is off all cardiac meds   HE says he has  No chest pain. He has put on about 40 to 50 pounds since his surgery, denies any  palpitations,    He was  seen in the hospital  In August 2017 when he presented with A. Fib. In setting of thyroid storm  TSH was less than 0.01 and T3 was 22,we decided to manage him with rate control strategy until his thyroid issues were resolved. Current Outpatient Medications   Medication Sig Dispense Refill    losartan-hydroCHLOROthiazide (HYZAAR) 50-12.5 MG per tablet Take 1 tablet by mouth daily 90 tablet 1    levothyroxine (SYNTHROID) 150 MCG tablet Take 1 tablet by mouth Daily      aspirin 325 MG EC tablet Take 1 tablet by mouth daily 30 tablet 3     No current facility-administered medications for this visit. Allergies:   Patient has no known allergies.     Patient History:  Past Medical History:   Diagnosis Date    A-fib [facility])     Sees Dr. Nacho Aburto     H/O echocardiogram 08/23/2017    Normal

## 2023-08-03 ENCOUNTER — HOSPITAL ENCOUNTER (OUTPATIENT)
Age: 58
Discharge: HOME | End: 2023-08-03
Payer: COMMERCIAL

## 2023-08-03 DIAGNOSIS — M79.89 LEG SWELLING: ICD-10-CM

## 2023-08-03 DIAGNOSIS — R07.2 PRECORDIAL PAIN: ICD-10-CM

## 2023-08-03 DIAGNOSIS — I48.3 TYPICAL ATRIAL FLUTTER (HCC): ICD-10-CM

## 2023-08-03 DIAGNOSIS — E05.90 HYPERTHYROIDISM: ICD-10-CM

## 2023-08-03 DIAGNOSIS — R07.9 CHEST PAIN, UNSPECIFIED TYPE: ICD-10-CM

## 2023-08-03 DIAGNOSIS — I10 ESSENTIAL HYPERTENSION: Chronic | ICD-10-CM

## 2023-08-03 DIAGNOSIS — I48.91 ATRIAL FIBRILLATION WITH RVR (HCC): ICD-10-CM

## 2023-08-03 LAB
PRO-BNP: <36 PG/ML
TSH SERPL DL<=0.005 MIU/L-ACNC: 2.03 UIU/ML (ref 0.27–4.2)

## 2023-08-03 PROCEDURE — 83880 ASSAY OF NATRIURETIC PEPTIDE: CPT

## 2023-08-03 PROCEDURE — 36415 COLL VENOUS BLD VENIPUNCTURE: CPT

## 2023-08-03 PROCEDURE — 84443 ASSAY THYROID STIM HORMONE: CPT

## 2023-08-09 ENCOUNTER — PROCEDURE VISIT (OUTPATIENT)
Dept: CARDIOLOGY CLINIC | Age: 58
End: 2023-08-09
Payer: COMMERCIAL

## 2023-08-09 ENCOUNTER — TELEPHONE (OUTPATIENT)
Dept: CARDIOLOGY CLINIC | Age: 58
End: 2023-08-09

## 2023-08-09 DIAGNOSIS — M79.89 LEG SWELLING: ICD-10-CM

## 2023-08-09 DIAGNOSIS — R07.9 CHEST PAIN, UNSPECIFIED TYPE: ICD-10-CM

## 2023-08-09 DIAGNOSIS — I48.3 TYPICAL ATRIAL FLUTTER (HCC): ICD-10-CM

## 2023-08-09 DIAGNOSIS — I48.91 ATRIAL FIBRILLATION WITH RVR (HCC): ICD-10-CM

## 2023-08-09 DIAGNOSIS — I10 ESSENTIAL HYPERTENSION: Chronic | ICD-10-CM

## 2023-08-09 DIAGNOSIS — E05.90 HYPERTHYROIDISM: ICD-10-CM

## 2023-08-09 DIAGNOSIS — R07.2 PRECORDIAL PAIN: ICD-10-CM

## 2023-08-09 LAB
LV EF: 58 %
LVEF MODALITY: NORMAL

## 2023-08-09 PROCEDURE — 93970 EXTREMITY STUDY: CPT | Performed by: INTERNAL MEDICINE

## 2023-08-09 PROCEDURE — 93306 TTE W/DOPPLER COMPLETE: CPT | Performed by: INTERNAL MEDICINE

## 2023-08-09 NOTE — TELEPHONE ENCOUNTER
Called patient and provided the following:     No evidence of DVT or SVT in the bilateral common femoral vein, femoral   vein, popliteal vein, greater saphenous vein or small saphenous vein. Significant reflux noted of the Right CFV (1.1s), Pop V (1.5s), and GSV at   the mid thigh (5.3s), knee (3.5s), mid calf (1.1s) and distal calf (0.9s). Significant reflux noted in the Left CFV (1.9s), and GSV at th e SFJ (3.4s),   mid thigh (4.6s), knee (2.3s), and mid calf (0.9s).       Recommendations      wear compression stockings 20 -25 mmHG   Refer to Dr Vidhya Mcleod      Left voicemail to call office back as soon as possible to scheduled consult with Dr. Pauline Gan

## 2023-08-10 ENCOUNTER — PROCEDURE VISIT (OUTPATIENT)
Dept: CARDIOLOGY CLINIC | Age: 58
End: 2023-08-10

## 2023-08-10 DIAGNOSIS — M79.89 LEG SWELLING: ICD-10-CM

## 2023-08-10 DIAGNOSIS — E05.90 HYPERTHYROIDISM: ICD-10-CM

## 2023-08-10 DIAGNOSIS — I48.3 TYPICAL ATRIAL FLUTTER (HCC): ICD-10-CM

## 2023-08-10 DIAGNOSIS — I48.91 ATRIAL FIBRILLATION WITH RVR (HCC): ICD-10-CM

## 2023-08-10 DIAGNOSIS — R07.2 PRECORDIAL PAIN: ICD-10-CM

## 2023-08-10 DIAGNOSIS — R07.9 CHEST PAIN, UNSPECIFIED TYPE: ICD-10-CM

## 2023-08-10 DIAGNOSIS — I10 ESSENTIAL HYPERTENSION: Chronic | ICD-10-CM

## 2023-08-10 LAB
LV EF: 57 %
LVEF MODALITY: NORMAL

## 2023-08-11 ENCOUNTER — TELEPHONE (OUTPATIENT)
Dept: CARDIOLOGY CLINIC | Age: 58
End: 2023-08-11

## 2023-08-11 NOTE — TELEPHONE ENCOUNTER
Called to patient the results of his recent testing. Normal left ventricle structure and systolic function with an ejection   fraction of 55-60%. Essentially normal chamber sizes. No significant valvular disease noted. Essentially normal echo  ECG portion of stress test is negative for ischemia by diagnostic criteria. Completed 10.1 METS and 10 Mins of exercise   Normal EF 57 % with normal ventricular contractility. No infarct or ischemia   noted. Normal stress myocardial perfusion. This is a normal study.

## 2023-09-14 ENCOUNTER — INITIAL CONSULT (OUTPATIENT)
Dept: CARDIOLOGY CLINIC | Age: 58
End: 2023-09-14

## 2023-09-14 ENCOUNTER — OFFICE VISIT (OUTPATIENT)
Dept: CARDIOLOGY CLINIC | Age: 58
End: 2023-09-14
Payer: COMMERCIAL

## 2023-09-14 VITALS
OXYGEN SATURATION: 96 % | HEIGHT: 74 IN | HEART RATE: 94 BPM | SYSTOLIC BLOOD PRESSURE: 138 MMHG | BODY MASS INDEX: 31.18 KG/M2 | WEIGHT: 243 LBS | DIASTOLIC BLOOD PRESSURE: 86 MMHG

## 2023-09-14 VITALS
DIASTOLIC BLOOD PRESSURE: 78 MMHG | BODY MASS INDEX: 31.18 KG/M2 | HEIGHT: 74 IN | WEIGHT: 243 LBS | SYSTOLIC BLOOD PRESSURE: 120 MMHG | HEART RATE: 76 BPM

## 2023-09-14 DIAGNOSIS — M79.89 LEG SWELLING: Primary | ICD-10-CM

## 2023-09-14 DIAGNOSIS — R55 SYNCOPE AND COLLAPSE: ICD-10-CM

## 2023-09-14 DIAGNOSIS — I48.91 ATRIAL FIBRILLATION WITH RVR (HCC): ICD-10-CM

## 2023-09-14 DIAGNOSIS — E05.90 HYPERTHYROIDISM: ICD-10-CM

## 2023-09-14 DIAGNOSIS — I10 ESSENTIAL HYPERTENSION: Chronic | ICD-10-CM

## 2023-09-14 DIAGNOSIS — E05.80: ICD-10-CM

## 2023-09-14 DIAGNOSIS — I48.3 TYPICAL ATRIAL FLUTTER (HCC): ICD-10-CM

## 2023-09-14 DIAGNOSIS — I87.2 VENOUS REFLUX: ICD-10-CM

## 2023-09-14 DIAGNOSIS — I83.893 VARICOSE VEINS OF BOTH LEGS WITH EDEMA: Primary | ICD-10-CM

## 2023-09-14 DIAGNOSIS — R07.2 PRECORDIAL PAIN: ICD-10-CM

## 2023-09-14 PROCEDURE — 3075F SYST BP GE 130 - 139MM HG: CPT | Performed by: INTERNAL MEDICINE

## 2023-09-14 PROCEDURE — 99214 OFFICE O/P EST MOD 30 MIN: CPT | Performed by: INTERNAL MEDICINE

## 2023-09-14 PROCEDURE — 3079F DIAST BP 80-89 MM HG: CPT | Performed by: INTERNAL MEDICINE

## 2023-09-14 RX ORDER — CALCITRIOL 0.5 UG/1
0.5 CAPSULE, LIQUID FILLED ORAL DAILY
COMMUNITY
Start: 2023-08-16

## 2023-09-14 RX ORDER — ATORVASTATIN CALCIUM 10 MG/1
10 TABLET, FILM COATED ORAL
COMMUNITY
Start: 2023-08-17

## 2023-09-14 RX ORDER — PANTOPRAZOLE SODIUM 40 MG/1
40 TABLET, DELAYED RELEASE ORAL
Qty: 30 TABLET | Refills: 0 | Status: SHIPPED | OUTPATIENT
Start: 2023-09-14

## 2023-09-14 NOTE — PROGRESS NOTES
Response: Normal resting BP -   Predicted HR: 167 bpm                  appropriate response   % of predicted HR: 99                  HR BP Product: [id number]   Exercise Duration: 09:59 min           Max Exercise: 11.7 METS   Reason for Termination: Target heart    Echo 8/2020   Left ventricular systolic function is normal with an ejection fraction of   55-60%. No significant valvular disease or diastolic dysfunction noted. Essentially normal echocardiogram.      Stress test 7/19/2023  Supervising physician Dr. Francisco Seo . ECG portion of stress test is negative for ischemia by diagnostic criteria. Completed 10.1 METS and 10 Mins of exercise   Normal EF 57 % with normal ventricular contractility. No infarct or ischemia   noted. Normal stress myocardial perfusion. This is a normal study. Echo 8/9/2023     Summary   Normal left ventricle structure and systolic function with an ejection   fraction of 55-60%. Essentially normal chamber sizes. No significant valvular disease noted. Essentially normal echo    Venous reflux 8/9/2023   No evidence of DVT or SVT in the bilateral common femoral vein, femoral   vein, popliteal vein, greater saphenous vein or small saphenous vein. Significant reflux noted of the Right CFV (1.1s), Pop V (1.5s), and GSV at   the mid thigh (5.3s), knee (3.5s), mid calf (1.1s) and distal calf (0.9s). Significant reflux noted in the Left CFV (1.9s), and GSV at th e SFJ (3.4s),   mid thigh (4.6s), knee (2.3s), and mid calf (0.9s). All labs, medications and tests reviewed by myself including data and history from outside source , patient and available family . Assessment & Plan:      1. Leg swelling    2. Essential hypertension    3. Typical atrial flutter ([address])    4. Atrial fibrillation with RVR ([address])    5. Hyperthyroidism    6. 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 Syncope and collapse    8. Precordial pain    9.  Venous

## 2023-09-14 NOTE — PROGRESS NOTES
06/29/2023 09:00 PM    CO2 29 08/08/2020 10:35 PM    BUN 10 06/29/2023 09:00 PM    BUN 10 08/08/2020 10:35 PM    CREATININE 1.0 06/29/2023 09:00 PM    CREATININE 1.2 08/08/2020 10:35 PM     CMP:    Lab Results   Component Value Date/Time     06/29/2023 09:00 PM     08/08/2020 10:35 PM    K 3.6 06/29/2023 09:00 PM    K 3.9 08/08/2020 10:35 PM     06/29/2023 09:00 PM     08/08/2020 10:35 PM    CO2 23 06/29/2023 09:00 PM    CO2 29 08/08/2020 10:35 PM    BUN 10 06/29/2023 09:00 PM    BUN 10 08/08/2020 10:35 PM    CREATININE 1.0 06/29/2023 09:00 PM    CREATININE 1.2 08/08/2020 10:35 PM    PROT 6.8 06/29/2023 09:00 PM    PROT 6.7 08/08/2020 10:35 PM     TSH:    Lab Results   Component Value Date/Time    TSH 0.004 11/02/2017 12:00 AM    TSH 0.004 11/02/2017 12:00 AM    TSHHS 2.030 08/03/2023 01:40 PM    TSHHS 1.960 06/29/2023 09:00 PM     8/9/2023   No evidence of DVT or SVT in the bilateral common femoral vein, femoral   vein, popliteal vein, greater saphenous vein or small saphenous vein. Significant reflux noted of the Right CFV (1.1s), Pop V (1.5s), and GSV at   the mid thigh (5.3s), knee (3.5s), mid calf (1.1s) and distal calf (0.9s). Significant reflux noted in the Left CFV (1.9s), and GSV at th e SFJ (3.4s),   mid thigh (4.6s), knee (2.3s), and mid calf (0.9s). QUALITY MEASURES REVIEWED:  1.CAD:Patient is taking anti platelet agent:Yes  Patient does not have Hx of documented CAD  2. DYSLIPIDEMIA: Patient is on cholesterol lowering medication:Yes   3. Beta-Blocker therapy for CAD, if prior Myocardial Infarction:No   4. Counselled regarding smoking cessation. No   Patient does not Smoke. 5.Anticoagulation therapy (for A.Fib) No   Does Not have A.Fib.  6.Discussed weight management strategies. Assessment, Recommendations & Tests:     CVI: Patient has symptomatic C4 venous disease & an abnormal venous US, revealing both superficial as well as deep vein Reflux problem.   Suggest

## 2023-09-14 NOTE — PATIENT INSTRUCTIONS
CVI: Patient has symptomatic C4 venous disease & an abnormal venous US, revealing both superficial as well as deep vein Reflux problem. Suggest continue to wear compression stockings. Recommend serial ablations of bot GSVs    I spent about 30 min. of time in review of the available data, chart Prep., interviewing patient, obtaining history, performing physical exam, going through decision making analysis for assessment & plans of management on this patient. PLAN: serial ablations of bot GSVs    Office Visit a month after the procedures.      Charles Rice MD, 9/14/2023 11:44 AM

## 2023-10-02 ENCOUNTER — TELEPHONE (OUTPATIENT)
Dept: CARDIOLOGY CLINIC | Age: 58
End: 2023-10-02

## 2023-10-02 NOTE — TELEPHONE ENCOUNTER
Per Cory Paige with [address] code [id number] RF has been approved for bilat. procedures. Venaseal denied due to investigational, Artie Maillard denied would nee further info on veins involved.  Will receive letter for PA Case # [id number]

## 2023-10-03 ENCOUNTER — TELEPHONE (OUTPATIENT)
Dept: CARDIOLOGY CLINIC | Age: 58
End: 2023-10-03